# Patient Record
Sex: MALE | Race: WHITE | NOT HISPANIC OR LATINO | Employment: FULL TIME | URBAN - METROPOLITAN AREA
[De-identification: names, ages, dates, MRNs, and addresses within clinical notes are randomized per-mention and may not be internally consistent; named-entity substitution may affect disease eponyms.]

---

## 2018-01-09 ENCOUNTER — RECORDS - HEALTHEAST (OUTPATIENT)
Dept: LAB | Facility: CLINIC | Age: 58
End: 2018-01-09

## 2018-01-09 LAB
CHOLEST SERPL-MCNC: 132 MG/DL
CREAT UR-MCNC: 34.8 MG/DL
FASTING STATUS PATIENT QL REPORTED: ABNORMAL
HDLC SERPL-MCNC: 38 MG/DL
LDLC SERPL CALC-MCNC: 62 MG/DL
MICROALBUMIN UR-MCNC: <0.5 MG/DL (ref 0–1.99)
MICROALBUMIN/CREAT UR: NORMAL MG/G
TRIGL SERPL-MCNC: 159 MG/DL

## 2018-01-11 LAB — BACTERIA SPEC CULT: NORMAL

## 2018-12-21 ENCOUNTER — RECORDS - HEALTHEAST (OUTPATIENT)
Dept: LAB | Facility: CLINIC | Age: 58
End: 2018-12-21

## 2018-12-21 LAB
FERRITIN SERPL-MCNC: 63 NG/ML (ref 27–300)
IRON SATN MFR SERPL: 18 % (ref 20–50)
IRON SERPL-MCNC: 66 UG/DL (ref 42–175)
TIBC SERPL-MCNC: 362 UG/DL (ref 313–563)
TRANSFERRIN SERPL-MCNC: 289 MG/DL (ref 212–360)

## 2019-06-26 ENCOUNTER — RECORDS - HEALTHEAST (OUTPATIENT)
Dept: LAB | Facility: CLINIC | Age: 59
End: 2019-06-26

## 2019-06-26 LAB
CHOLEST SERPL-MCNC: 126 MG/DL
FASTING STATUS PATIENT QL REPORTED: NO
HDLC SERPL-MCNC: 38 MG/DL
LDLC SERPL CALC-MCNC: 62 MG/DL
TRIGL SERPL-MCNC: 131 MG/DL

## 2020-10-07 ENCOUNTER — RECORDS - HEALTHEAST (OUTPATIENT)
Dept: LAB | Facility: CLINIC | Age: 60
End: 2020-10-07

## 2020-10-07 LAB
ALBUMIN SERPL-MCNC: 4.1 G/DL (ref 3.5–5)
ALP SERPL-CCNC: 76 U/L (ref 45–120)
ALT SERPL W P-5'-P-CCNC: 20 U/L (ref 0–45)
ANION GAP SERPL CALCULATED.3IONS-SCNC: 8 MMOL/L (ref 5–18)
AST SERPL W P-5'-P-CCNC: 17 U/L (ref 0–40)
BILIRUB SERPL-MCNC: 0.3 MG/DL (ref 0–1)
BUN SERPL-MCNC: 10 MG/DL (ref 8–22)
CALCIUM SERPL-MCNC: 9.3 MG/DL (ref 8.5–10.5)
CHLORIDE BLD-SCNC: 101 MMOL/L (ref 98–107)
CHOLEST SERPL-MCNC: 120 MG/DL
CO2 SERPL-SCNC: 27 MMOL/L (ref 22–31)
CREAT SERPL-MCNC: 0.94 MG/DL (ref 0.7–1.3)
FASTING STATUS PATIENT QL REPORTED: NO
GFR SERPL CREATININE-BSD FRML MDRD: >60 ML/MIN/1.73M2
GLUCOSE BLD-MCNC: 223 MG/DL (ref 70–125)
HDLC SERPL-MCNC: 39 MG/DL
LDLC SERPL CALC-MCNC: 55 MG/DL
POTASSIUM BLD-SCNC: 4.6 MMOL/L (ref 3.5–5)
PROT SERPL-MCNC: 6.7 G/DL (ref 6–8)
SODIUM SERPL-SCNC: 136 MMOL/L (ref 136–145)
TRIGL SERPL-MCNC: 128 MG/DL

## 2021-01-27 ENCOUNTER — THERAPY VISIT (OUTPATIENT)
Dept: PHYSICAL THERAPY | Facility: CLINIC | Age: 61
End: 2021-01-27
Payer: COMMERCIAL

## 2021-01-27 DIAGNOSIS — M54.12 CERVICAL RADICULITIS: ICD-10-CM

## 2021-01-27 PROCEDURE — 97110 THERAPEUTIC EXERCISES: CPT | Mod: GP | Performed by: PHYSICAL THERAPIST

## 2021-01-27 PROCEDURE — 97530 THERAPEUTIC ACTIVITIES: CPT | Mod: GP | Performed by: PHYSICAL THERAPIST

## 2021-01-27 PROCEDURE — 97161 PT EVAL LOW COMPLEX 20 MIN: CPT | Mod: GP | Performed by: PHYSICAL THERAPIST

## 2021-01-27 NOTE — PROGRESS NOTES
[facility] Initial Evaluation  Subjective:  The history is provided by the patient.                       Objective:  System    Physical Exam    General     ROS    Assessment/Plan:    Patient is a 60 year old male with cervical complaints.    Patient has the following significant findings with corresponding treatment plan.                Diagnosis 1:  Neck pain  Pain -  hot/cold therapy, US, electric stimulation, mechanical traction, manual therapy, splint/taping/bracing/orthotics, self management, education, directional preference exercise and home program  Decreased ROM/flexibility - manual therapy, therapeutic exercise, therapeutic activity and home program  Decreased joint mobility - manual therapy, therapeutic exercise, therapeutic activity and home program  Decreased strength - therapeutic exercise, therapeutic activities and home program  Impaired balance - neuro re-education, therapeutic activities and home program  Impaired muscle performance - neuro re-education and home program  Decreased function - therapeutic activities and home program  Impaired posture - neuro re-education, therapeutic activities and home program    Therapy Evaluation Codes:   1) History comprised of:   Personal factors that impact the plan of care:      None.    Comorbidity factors that impact the plan of care are:      Diabetes and Overweight.     Medications impacting care: None.  2) Examination of Body Systems comprised of:   Body structures and functions that impact the plan of care:      Cervical spine.   Activity limitations that impact the plan of care are:      Lifting, Reading/Computer work and Sleeping.  3) Clinical presentation characteristics are:   Stable/Uncomplicated.  4) Decision-Making    Low complexity using standardized patient assessment instrument and/or measureable assessment of functional outcome.  Cumulative Therapy Evaluation is: Low complexity.    Previous and current functional  limitations:  (See Goal Flow Sheet for this information)    Short term and Long term goals: (See Goal Flow Sheet for this information)     Communication ability:  Patient appears to be able to clearly communicate and understand verbal and written communication and follow directions correctly.  Treatment Explanation - The following has been discussed with the patient:   RX ordered/plan of care  Anticipated outcomes  Possible risks and side effects  This patient would benefit from PT intervention to resume normal activities.   Rehab potential is good.    Frequency:  1 X week, once daily  Duration:  for 12 weeks  Discharge Plan:  Achieve all LTG.  Independent in home treatment program.  Reach maximal therapeutic benefit.    Please refer to the daily flowsheet for treatment today, total treatment time and time spent performing 1:1 timed codes.

## 2021-02-03 ENCOUNTER — THERAPY VISIT (OUTPATIENT)
Dept: PHYSICAL THERAPY | Facility: CLINIC | Age: 61
End: 2021-02-03
Payer: COMMERCIAL

## 2021-02-03 DIAGNOSIS — M54.12 CERVICAL RADICULITIS: ICD-10-CM

## 2021-02-03 PROCEDURE — 97110 THERAPEUTIC EXERCISES: CPT | Mod: GP | Performed by: PHYSICAL THERAPIST

## 2021-02-10 ENCOUNTER — THERAPY VISIT (OUTPATIENT)
Dept: PHYSICAL THERAPY | Facility: CLINIC | Age: 61
End: 2021-02-10
Payer: COMMERCIAL

## 2021-02-10 DIAGNOSIS — M54.12 CERVICAL RADICULITIS: ICD-10-CM

## 2021-02-10 PROCEDURE — 97110 THERAPEUTIC EXERCISES: CPT | Mod: GP | Performed by: PHYSICAL THERAPIST

## 2021-02-18 ENCOUNTER — THERAPY VISIT (OUTPATIENT)
Dept: PHYSICAL THERAPY | Facility: CLINIC | Age: 61
End: 2021-02-18
Payer: COMMERCIAL

## 2021-02-18 DIAGNOSIS — M54.12 CERVICAL RADICULITIS: ICD-10-CM

## 2021-02-18 PROCEDURE — 97110 THERAPEUTIC EXERCISES: CPT | Mod: GP | Performed by: PHYSICAL THERAPIST

## 2021-02-18 PROCEDURE — 97112 NEUROMUSCULAR REEDUCATION: CPT | Mod: GP | Performed by: PHYSICAL THERAPIST

## 2021-03-02 ENCOUNTER — THERAPY VISIT (OUTPATIENT)
Dept: PHYSICAL THERAPY | Facility: CLINIC | Age: 61
End: 2021-03-02
Payer: COMMERCIAL

## 2021-03-02 DIAGNOSIS — M54.12 CERVICAL RADICULITIS: ICD-10-CM

## 2021-03-02 PROCEDURE — 97110 THERAPEUTIC EXERCISES: CPT | Mod: GP | Performed by: PHYSICAL THERAPIST

## 2021-03-02 PROCEDURE — 97112 NEUROMUSCULAR REEDUCATION: CPT | Mod: GP | Performed by: PHYSICAL THERAPIST

## 2021-03-16 ENCOUNTER — THERAPY VISIT (OUTPATIENT)
Dept: PHYSICAL THERAPY | Facility: CLINIC | Age: 61
End: 2021-03-16
Payer: COMMERCIAL

## 2021-03-16 DIAGNOSIS — M54.12 CERVICAL RADICULITIS: ICD-10-CM

## 2021-03-16 PROCEDURE — 97112 NEUROMUSCULAR REEDUCATION: CPT | Mod: GP | Performed by: PHYSICAL THERAPIST

## 2021-03-16 PROCEDURE — 97110 THERAPEUTIC EXERCISES: CPT | Mod: GP | Performed by: PHYSICAL THERAPIST

## 2021-04-02 ENCOUNTER — IMMUNIZATION (OUTPATIENT)
Dept: NURSING | Facility: CLINIC | Age: 61
End: 2021-04-02
Payer: COMMERCIAL

## 2021-04-02 PROCEDURE — 0001A PR COVID VAC PFIZER DIL RECON 30 MCG/0.3 ML IM: CPT

## 2021-04-02 PROCEDURE — 91300 PR COVID VAC PFIZER DIL RECON 30 MCG/0.3 ML IM: CPT

## 2021-04-23 ENCOUNTER — IMMUNIZATION (OUTPATIENT)
Dept: NURSING | Facility: CLINIC | Age: 61
End: 2021-04-23
Attending: FAMILY MEDICINE
Payer: COMMERCIAL

## 2021-04-23 PROCEDURE — 0002A PR COVID VAC PFIZER DIL RECON 30 MCG/0.3 ML IM: CPT

## 2021-04-23 PROCEDURE — 91300 PR COVID VAC PFIZER DIL RECON 30 MCG/0.3 ML IM: CPT

## 2021-05-12 ENCOUNTER — RECORDS - HEALTHEAST (OUTPATIENT)
Dept: ADMINISTRATIVE | Facility: OTHER | Age: 61
End: 2021-05-12

## 2021-05-12 ENCOUNTER — HOSPITAL ENCOUNTER (OUTPATIENT)
Dept: ULTRASOUND IMAGING | Facility: HOSPITAL | Age: 61
Discharge: HOME | End: 2021-05-12
Attending: FAMILY MEDICINE
Payer: COMMERCIAL

## 2021-05-12 DIAGNOSIS — M79.89 RIGHT LEG SWELLING: ICD-10-CM

## 2021-05-22 ENCOUNTER — HEALTH MAINTENANCE LETTER (OUTPATIENT)
Age: 61
End: 2021-05-22

## 2021-05-25 ENCOUNTER — RECORDS - HEALTHEAST (OUTPATIENT)
Dept: ADMINISTRATIVE | Facility: CLINIC | Age: 61
End: 2021-05-25

## 2021-08-27 ENCOUNTER — LAB REQUISITION (OUTPATIENT)
Dept: LAB | Facility: CLINIC | Age: 61
End: 2021-08-27

## 2021-08-27 DIAGNOSIS — E78.5 HYPERLIPIDEMIA, UNSPECIFIED: ICD-10-CM

## 2021-08-27 LAB
CHOLEST SERPL-MCNC: 123 MG/DL
HDLC SERPL-MCNC: 39 MG/DL
LDLC SERPL CALC-MCNC: 47 MG/DL
TRIGL SERPL-MCNC: 185 MG/DL

## 2021-08-27 PROCEDURE — 80061 LIPID PANEL: CPT | Performed by: FAMILY MEDICINE

## 2021-09-11 ENCOUNTER — HEALTH MAINTENANCE LETTER (OUTPATIENT)
Age: 61
End: 2021-09-11

## 2021-09-26 ENCOUNTER — HOSPITAL ENCOUNTER (OUTPATIENT)
Facility: CLINIC | Age: 61
Setting detail: OBSERVATION
Discharge: HOME | End: 2021-09-27
Attending: EMERGENCY MEDICINE | Admitting: NURSE PRACTITIONER
Payer: COMMERCIAL

## 2021-09-26 DIAGNOSIS — Z11.52 ENCOUNTER FOR SCREENING LABORATORY TESTING FOR SEVERE ACUTE RESPIRATORY SYNDROME CORONAVIRUS 2 (SARS-COV-2): ICD-10-CM

## 2021-09-26 DIAGNOSIS — T78.2XXA ANAPHYLAXIS, INITIAL ENCOUNTER: ICD-10-CM

## 2021-09-26 LAB
ANION GAP SERPL CALCULATED.3IONS-SCNC: 7 MMOL/L (ref 3–14)
BUN SERPL-MCNC: 14 MG/DL (ref 7–30)
CALCIUM SERPL-MCNC: 9.5 MG/DL (ref 8.5–10.1)
CHLORIDE BLD-SCNC: 107 MMOL/L (ref 94–109)
CO2 SERPL-SCNC: 25 MMOL/L (ref 20–32)
CREAT SERPL-MCNC: 0.81 MG/DL (ref 0.66–1.25)
GFR SERPL CREATININE-BSD FRML MDRD: >90 ML/MIN/1.73M2
GLUCOSE BLD-MCNC: 139 MG/DL (ref 70–99)
HOLD SPECIMEN: NORMAL
POTASSIUM BLD-SCNC: 3.8 MMOL/L (ref 3.4–5.3)
SARS-COV-2 RNA RESP QL NAA+PROBE: NEGATIVE
SODIUM SERPL-SCNC: 139 MMOL/L (ref 133–144)

## 2021-09-26 PROCEDURE — 96376 TX/PRO/DX INJ SAME DRUG ADON: CPT

## 2021-09-26 PROCEDURE — 250N000011 HC RX IP 250 OP 636: Performed by: PHYSICIAN ASSISTANT

## 2021-09-26 PROCEDURE — 96365 THER/PROPH/DIAG IV INF INIT: CPT | Performed by: EMERGENCY MEDICINE

## 2021-09-26 PROCEDURE — 99285 EMERGENCY DEPT VISIT HI MDM: CPT | Mod: 25 | Performed by: EMERGENCY MEDICINE

## 2021-09-26 PROCEDURE — 80048 BASIC METABOLIC PNL TOTAL CA: CPT | Performed by: EMERGENCY MEDICINE

## 2021-09-26 PROCEDURE — G0378 HOSPITAL OBSERVATION PER HR: HCPCS

## 2021-09-26 PROCEDURE — 99220 PR INITIAL OBSERVATION CARE,LEVEL III: CPT | Performed by: EMERGENCY MEDICINE

## 2021-09-26 PROCEDURE — 99284 EMERGENCY DEPT VISIT MOD MDM: CPT | Performed by: EMERGENCY MEDICINE

## 2021-09-26 PROCEDURE — 250N000011 HC RX IP 250 OP 636: Performed by: EMERGENCY MEDICINE

## 2021-09-26 PROCEDURE — 36415 COLL VENOUS BLD VENIPUNCTURE: CPT | Performed by: EMERGENCY MEDICINE

## 2021-09-26 PROCEDURE — 96375 TX/PRO/DX INJ NEW DRUG ADDON: CPT

## 2021-09-26 PROCEDURE — 96376 TX/PRO/DX INJ SAME DRUG ADON: CPT | Performed by: EMERGENCY MEDICINE

## 2021-09-26 PROCEDURE — C9803 HOPD COVID-19 SPEC COLLECT: HCPCS | Performed by: EMERGENCY MEDICINE

## 2021-09-26 PROCEDURE — U0005 INFEC AGEN DETEC AMPLI PROBE: HCPCS | Performed by: EMERGENCY MEDICINE

## 2021-09-26 RX ORDER — METHYLPREDNISOLONE SODIUM SUCCINATE 40 MG/ML
40 INJECTION, POWDER, LYOPHILIZED, FOR SOLUTION INTRAMUSCULAR; INTRAVENOUS EVERY 6 HOURS
Status: DISCONTINUED | OUTPATIENT
Start: 2021-09-26 | End: 2021-09-27 | Stop reason: HOSPADM

## 2021-09-26 RX ORDER — ONDANSETRON 2 MG/ML
4 INJECTION INTRAMUSCULAR; INTRAVENOUS EVERY 6 HOURS PRN
Status: DISCONTINUED | OUTPATIENT
Start: 2021-09-26 | End: 2021-09-27 | Stop reason: HOSPADM

## 2021-09-26 RX ORDER — ONDANSETRON 4 MG/1
4 TABLET, ORALLY DISINTEGRATING ORAL EVERY 6 HOURS PRN
Status: DISCONTINUED | OUTPATIENT
Start: 2021-09-26 | End: 2021-09-27 | Stop reason: HOSPADM

## 2021-09-26 RX ORDER — MORPHINE SULFATE 2 MG/ML
2 INJECTION, SOLUTION INTRAMUSCULAR; INTRAVENOUS
Status: DISCONTINUED | OUTPATIENT
Start: 2021-09-26 | End: 2021-09-26

## 2021-09-26 RX ORDER — DIPHENHYDRAMINE HYDROCHLORIDE 50 MG/ML
25 INJECTION INTRAMUSCULAR; INTRAVENOUS EVERY 6 HOURS
Status: DISCONTINUED | OUTPATIENT
Start: 2021-09-26 | End: 2021-09-27 | Stop reason: HOSPADM

## 2021-09-26 RX ORDER — DIPHENHYDRAMINE HYDROCHLORIDE 50 MG/ML
25 INJECTION INTRAMUSCULAR; INTRAVENOUS ONCE
Status: COMPLETED | OUTPATIENT
Start: 2021-09-26 | End: 2021-09-26

## 2021-09-26 RX ADMIN — DIPHENHYDRAMINE HYDROCHLORIDE 25 MG: 50 INJECTION INTRAMUSCULAR; INTRAVENOUS at 17:02

## 2021-09-26 RX ADMIN — FAMOTIDINE 20 MG: 20 INJECTION, SOLUTION INTRAVENOUS at 17:02

## 2021-09-26 RX ADMIN — METHYLPREDNISOLONE SODIUM SUCCINATE 40 MG: 40 INJECTION, POWDER, FOR SOLUTION INTRAMUSCULAR; INTRAVENOUS at 23:31

## 2021-09-26 RX ADMIN — DIPHENHYDRAMINE HYDROCHLORIDE 25 MG: 50 INJECTION, SOLUTION INTRAMUSCULAR; INTRAVENOUS at 23:31

## 2021-09-26 ASSESSMENT — ENCOUNTER SYMPTOMS
SHORTNESS OF BREATH: 1
ALLERGIC/IMMUNOLOGIC COMMENTS: POSITIVE FOR HIVES
TROUBLE SWALLOWING: 1

## 2021-09-26 NOTE — ED TRIAGE NOTES
Pt BIBA from urgent care due to allergic reaction. Pt states that he does not know what caused this reaction however had an acute onset of difficulty swallowing and breathing, left eye swelling and angioedema. IM epi administered at urgent care as well as 25mg PO benadryl and 25mg IV benadryl. Pt reports relief at this time, however states it is still somewhat difficult to swallow.

## 2021-09-26 NOTE — LETTER
Date: Sep 27, 2021    TO WHOM IT MAY CONCERN:    Patient Santhosh Barreto was in the hospital from 9/26/2021 to 9/27/2021.  Please excuse him from work. He may return to work 9/28/2021 without restrictions.     Elly Granados PA-C

## 2021-09-26 NOTE — ED NOTES
[facility]   ED Nurse to Floor Handoff     Santhosh Barreto is a 61 year old male who speaks English and lives with a spouse,  in a home  They arrived in the ED by ambulance from clinic    ED Chief Complaint: Allergic Reaction    ED Dx;   Final diagnoses:   None         Needed?: No    Allergies: No Known Allergies.  Past Medical Hx: No past medical history on file.   Baseline Mental status: WDL  Current Mental Status changes: at basesline    Infection present or suspected this encounter: no  Sepsis suspected: No  Isolation type: No active isolations  Patient tested for COVID 19 prior to admission: YES     Activity level - Baseline/Home:  Independent  Activity Level - Current:   Independent    Bariatric equipment needed?: No    In the ED these meds were given:   Medications   diphenhydrAMINE (BENADRYL) injection 25 mg (25 mg Intravenous Given 9/26/21 1702)   famotidine (PEPCID) infusion 20 mg (0 mg Intravenous Stopped 9/26/21 1721)       Drips running?  No    Home pump  No    Current LDAs  Peripheral IV 09/26/21 Anterior;Right Upper forearm (Active)   Number of days: 0       Peripheral IV 09/26/21 (Active)   Number of days: 0       Labs results:   Labs Ordered and Resulted from Time of ED Arrival Up to the Time of Departure from the ED   EXTRA RED TOP TUBE   EXTRA GREEN TOP (LITHIUM HEPARIN) TUBE   EXTRA PURPLE TOP TUBE   COVID-19 VIRUS (CORONAVIRUS) BY PCR   BASIC METABOLIC PANEL   EXTRA TUBE    Narrative:     The following orders were created for panel order [place] Draw.  Procedure                               Abnormality         Status                     ---------                               -----------         ------                     Extra Red Top Tube[[id number]]                               Final result               Extra Green Top (Lithium...[[id number]]                      Final result               Extra Purple Top Tube[[id number]]                             Final result                 Please view results for these tests on the individual orders.       Imaging Studies: No results found for this or any previous visit (from the past 24 hour(s)).    Recent vital signs:   BP (!) 144/74   Pulse 85   Temp 98.8  F (37.1  C) (Oral)   Resp 20   SpO2 94%     [place]oly Coma Scale Score: 15 (09/26/21 1809)       Cardiac Rhythm: Normal Sinus  Pt needs tele? No  Skin/wound Issues: None    Code Status: Full Code    Pain control: pt had none    Nausea control: pt had none    Abnormal labs/tests/findings requiring intervention: see results    Family present during ED course? Yes   Family Comments/Social Situation comments: bedside currently     Tasks needing completion: None    Prema Kimble RN  [facility] -- [phone] [place] [facility]  [phone] [facility]

## 2021-09-26 NOTE — ED PROVIDER NOTES
[place] EMERGENCY DEPARTMENT ([facility])  9/26/21  History     Chief Complaint   Patient presents with     Allergic Reaction     The history is provided by the patient and medical records.     Santhosh Barreto is a 61 year old male with a past medical history significant for type 2 diabetes mellitus (non-insulin dependent) who presents to the Emergency Department by EMS for evaluation following an allergic reaction. Patient is unable to identify the cause of this reaction. He endorses acute onset of throat swelling accompanied by difficulty swallowing and breathing. He also endorses left eye swelling, right upper lip swelling, and hives along the left side of his face. Since receiving Solu-Medrol, Benadryl, fluids, and epinephrine at urgent care, his throat swelling has reduced by about one third. Other than this, he reports continuation of his symptoms here in the ED. He denies itchiness or rash. He reports that his systolic blood pressure is usually around 138. Here in the ED his systolic blood pressure is 178. He denies history of similar reactions or any known medication reactions. He has been on the same diabetic medications for an extended period of time without any recent dosage changes. He reports that he was sitting at his desk in his office, which is a familiar environment for him, when he began having this reaction. Prior to this he drank water, iced tea, and diet coke. He notes that he did attend his daughter's friend's open house around noon today and ate a variety of foods including ham, cheese, orange juice, celery, sausage sticks, dill dip, stone ground mustard, and a pastry.    Per chart review, patient was seen at [facility] prior to arrival here in the ED. About 30 minutes prior to arrival at Urgent care, patient took 25 mg Benadryl. Patient received IV Solu-Medrol, 25 mg  IV Benadryl, and fluids as well as 0.3 epinephrine at Urgent Care. He was subsequently transferred to  the ED for further evaluation and management.     No past medical history on file.    No past surgical history on file.    No family history on file.    Social History     Tobacco Use     Smoking status: Never Smoker     Smokeless tobacco: Never Used   Substance Use Topics     Alcohol use: Not on file       No current facility-administered medications for this encounter.     Current Outpatient Medications   Medication     acetaminophen-codeine (TYLENOL #3) 300-30 MG per tablet     aspirin 81 MG tablet     benzonatate (TESSALON) 100 MG capsule     METFORMIN HCL PO     Pioglitazone HCl (ACTOS PO)     SIMVASTATIN PO      No Known Allergies      I have reviewed the Medications, Allergies, Past Medical and Surgical History, and Social History in the Epic system.    Review of Systems   HENT: Positive for trouble swallowing.         Positive for throat swelling  Positive for Right upper lip swelling     Eyes:        Positive for left eye swelling   Respiratory: Positive for shortness of breath.    Skin: Negative for rash.   Allergic/Immunologic:        Positive for hives     All other systems reviewed and are negative.    A complete review of systems was performed with pertinent positives and negatives noted in the HPI, and all other systems negative.    Physical Exam   BP: 125/88  Pulse: 78  Temp: 98.8  F (37.1  C)  Resp: 16  SpO2: 100 %      Physical Exam  Vitals and nursing note reviewed.   Constitutional:       General: He is not in acute distress.     Appearance: Normal appearance. He is not diaphoretic.   HENT:      Head: Atraumatic.   Eyes:      General: No scleral icterus.     Pupils: Pupils are equal, round, and reactive to light.   Cardiovascular:      Rate and Rhythm: Normal rate and regular rhythm.      Heart sounds: Normal heart sounds.   Pulmonary:      Effort: No respiratory distress.      Breath sounds: Normal breath sounds.   Abdominal:      General: Bowel sounds are normal.      Palpations: Abdomen is  soft.      Tenderness: There is no abdominal tenderness.   Musculoskeletal:         General: No tenderness.   Skin:     General: Skin is warm.      Findings: Rash present.      Comments: Marked angioedema of the left periorbital region, hives to left cheek   Neurological:      General: No focal deficit present.      Mental Status: He is alert and oriented to person, place, and time.         ED Course     At 4:45 PM the patient was seen and examined by Isabela Michael MD in Room ED09.        Procedures           Results for orders placed or performed during the hospital encounter of 09/26/21 (from the past 24 hour(s))   [place] Draw    Narrative    The following orders were created for panel order [place] Draw.  Procedure                               Abnormality         Status                     ---------                               -----------         ------                     Extra Red Top Tube[[id number]]                               In process                 Extra Green Top (Lithium...[[id number]]                      In process                 Extra Purple Top Tube[[id number]]                            In process                   Please view results for these tests on the individual orders.     Medications   diphenhydrAMINE (BENADRYL) injection 25 mg (25 mg Intravenous Given 9/26/21 1702)   famotidine (PEPCID) infusion 20 mg (20 mg Intravenous New Bag 9/26/21 1702)             Assessments & Plan (with Medical Decision Making)     61 year old male with a past medical history significant for type 2 diabetes mellitus (non-insulin dependent) who presents to the Emergency Department by EMS for evaluation following an allergic reaction.  Prehospital records reviewed to confirm patient received IM injection of epinephrine 0.3 mg along with Benadryl 25 mg p.o. and Solu-Medrol 125 mg IV.  Upon arrival here in the emergency department patient was given further Benadryl 25 mg IV and Pepcid 20 mg IV.  After observation  for 2 hours patient's symptoms are gradually improving.  Patient placed on observation status after case discussion with ED observation provider.  Will transfer out of ED unit after 4 hours of observation.    I have reviewed the nursing notes.    I have reviewed the findings, diagnosis, plan and need for follow up with the patient.    New Prescriptions    No medications on file       Final diagnoses:   Anaphylaxis, initial encounter       IReba am serving as a trained medical scribe to document services personally performed by Isabela Michael MD, based on the provider's statements to me.      I, Isabela Michael MD, was physically present and have reviewed and verified the accuracy of this note documented by Reba Wright.     Isabela Michael MD  9/26/2021   [facility] EMERGENCY DEPARTMENT     Isabela Michael MD  09/26/21 1911

## 2021-09-27 VITALS
OXYGEN SATURATION: 99 % | RESPIRATION RATE: 18 BRPM | SYSTOLIC BLOOD PRESSURE: 145 MMHG | TEMPERATURE: 98.3 F | HEART RATE: 69 BPM | DIASTOLIC BLOOD PRESSURE: 81 MMHG

## 2021-09-27 LAB
ERYTHROCYTE [DISTWIDTH] IN BLOOD BY AUTOMATED COUNT: 13 % (ref 10–15)
HCT VFR BLD AUTO: 40 % (ref 40–53)
HGB BLD-MCNC: 13.9 G/DL (ref 13.3–17.7)
HOLD SPECIMEN: NORMAL
MCH RBC QN AUTO: 30.6 PG (ref 26.5–33)
MCHC RBC AUTO-ENTMCNC: 34.8 G/DL (ref 31.5–36.5)
MCV RBC AUTO: 88 FL (ref 78–100)
PLATELET # BLD AUTO: 88 10E3/UL (ref 150–450)
RBC # BLD AUTO: 4.54 10E6/UL (ref 4.4–5.9)
WBC # BLD AUTO: 6.2 10E3/UL (ref 4–11)

## 2021-09-27 PROCEDURE — 96376 TX/PRO/DX INJ SAME DRUG ADON: CPT

## 2021-09-27 PROCEDURE — 36415 COLL VENOUS BLD VENIPUNCTURE: CPT | Performed by: NURSE PRACTITIONER

## 2021-09-27 PROCEDURE — 250N000011 HC RX IP 250 OP 636: Performed by: PHYSICIAN ASSISTANT

## 2021-09-27 PROCEDURE — 85027 COMPLETE CBC AUTOMATED: CPT | Performed by: NURSE PRACTITIONER

## 2021-09-27 PROCEDURE — G0378 HOSPITAL OBSERVATION PER HR: HCPCS

## 2021-09-27 RX ORDER — FAMOTIDINE 20 MG/1
20 TABLET, FILM COATED ORAL 2 TIMES DAILY
Qty: 10 TABLET | Refills: 0 | Status: SHIPPED | OUTPATIENT
Start: 2021-09-27 | End: 2021-10-02

## 2021-09-27 RX ORDER — DIPHENHYDRAMINE HCL 25 MG
25 TABLET ORAL EVERY 6 HOURS
Qty: 4 TABLET | Refills: 0 | Status: SHIPPED | OUTPATIENT
Start: 2021-09-27 | End: 2021-09-28

## 2021-09-27 RX ORDER — EPINEPHRINE 0.15 MG/.3ML
0.15 INJECTION INTRAMUSCULAR ONCE
Qty: 0.3 ML | Refills: 0 | Status: SHIPPED | OUTPATIENT
Start: 2021-09-27 | End: 2021-09-27

## 2021-09-27 RX ORDER — PREDNISONE 20 MG/1
40 TABLET ORAL DAILY
Qty: 6 TABLET | Refills: 0 | Status: SHIPPED | OUTPATIENT
Start: 2021-09-28 | End: 2021-10-01

## 2021-09-27 RX ORDER — DIPHENHYDRAMINE HCL 25 MG
25 CAPSULE ORAL EVERY 6 HOURS PRN
Qty: 12 CAPSULE | Refills: 0 | Status: SHIPPED | OUTPATIENT
Start: 2021-09-27

## 2021-09-27 RX ADMIN — DIPHENHYDRAMINE HYDROCHLORIDE 25 MG: 50 INJECTION, SOLUTION INTRAMUSCULAR; INTRAVENOUS at 05:24

## 2021-09-27 RX ADMIN — FAMOTIDINE 20 MG: 20 INJECTION, SOLUTION INTRAVENOUS at 00:56

## 2021-09-27 RX ADMIN — FAMOTIDINE 20 MG: 20 INJECTION, SOLUTION INTRAVENOUS at 08:33

## 2021-09-27 RX ADMIN — METHYLPREDNISOLONE SODIUM SUCCINATE 40 MG: 40 INJECTION, POWDER, FOR SOLUTION INTRAMUSCULAR; INTRAVENOUS at 05:24

## 2021-09-27 NOTE — PLAN OF CARE
- Diagnostic tests and consults completed and resulted: No   - Vital signs normal or at patient baseline: No, BP elevated

## 2021-09-27 NOTE — H&P
"ED OBSERVATION HISTORY & PHYSICAL    Admission Date: 09/26/21  Attending Physician: Dr. Harika Grover    NP/PA: Suri Medina PA-C    REASON FOR ADMISSION:   Chief Complaint   Patient presents with     Allergic Reaction         HPI:    Santhosh Barreto is a 61 year old male with a history of IDANIA on CPAP, DM2 who presented to the ED with left eye swelling, left neck hives and perioral swelling.  He had been at an open house earlier today where he ate a variety of foods including raw vegetables and some dips.  About two hours later he developed sensation of throat swelling, difficulty swallowing, difficulty breathing, left eye and upper lip swelling, and neck hives (primarily left sided).  He presented to a local urgent care where he received steroids, fluids, benadryl and epinephrine.  He was subsequently transferred to the ED for further monitoring.     In the ED the patient was vitally stable and received IV pepcid and Benadryl. He had previously been evaluated at a local Urgent Care, where he received epinephrine.  This did not need to be repeated.  He was observed in the ED for four hours prior to admission to Observation.    On admission to the observation unit the patient was stable.  He is speaking clearly and states that the sensation of swelling in the back of his throat is much improved.  His left eye continues to feel swollen and is tearing up frequently but he denies foreign body sensation or gritty quality.  He is able to see clearly.  He states that his lips feel \"back to normal\" at this time.  He denies any former history of allergic reaction, recent medication changes, current or former ACEI use.     ROS:    CONSTITUTIONAL: Generally feels well. Denies fever, chills, sweats, fatigue, weakness, weight loss, or appetite changes.  SKIN: Denies rash, itching, bruising, new lumps or bumps, ecchymosis, hair changes or nail changes.  EYES: Denies visual changes, blurred vision, double vision, or eye " pain  EARS/NOSE/THROAT: Denies hearing loss, tinnitus, sinus pressure/drainage, PND, nasal congestion, runny nose, epistaxis, sore throat/mouth pain, change in taste, ear pain, bleeding gums, or hoarseness.  RESPIRATORY: Denies dyspnea at rest or with activity, cough, or hemoptysis.  CARDIOVASCULAR: Denies palpitations, chest pain/pressure, orthopnea, edema or open areas on extremities.  GASTROINTESTINAL: Good appetite and PO intake. Denies dysphagia, heartburn, nausea, vomiting, abdominal pain, constipation, or diarrhea.  GENITOURINARY: Denies dysuria, frequency, urgency, hesitancy, hematuria, or incontinence  MUSCULOSKELTAL: Denies muscle/joint pain and weakness  NEUROLOGIC: Denies headaches, dizziness, numbness or tingling of hands and feet, confusion, memory changes, lightheadedness/dizziness or difficulties with balance.  PSYCHIATRIC: Denies anxiety, depression, mental status changes, or change in mood.  HEME/LYMPH: Denies active bleeding, swollen nodes  VASCULAR ACCESS: Denies pain, redness, or discharge.    ROS negative other than the symptoms noted above.    History:    Medical History:   DM2 on oral therapy  IDANIA on CPAP    Patient denies surgical history    Patient states he has as on that has allergies to cats.  No family history of food allergies that he is aware of    Social History     Socioeconomic History     Marital status:      Spouse name: Not on file     Number of children: Not on file     Years of education: Not on file     Highest education level: Not on file   Occupational History     Not on file   Tobacco Use     Smoking status: Never Smoker     Smokeless tobacco: Never Used   Substance and Sexual Activity     Alcohol use: Not on file     Drug use: Not on file     Sexual activity: Not on file   Other Topics Concern     Not on file   Social History Narrative     Not on file     Social Determinants of Health     Financial Resource Strain:      Difficulty of Paying Living Expenses:    Food  Insecurity:      Worried About Running Out of Food in the Last Year:      Ran Out of Food in the Last Year:    Transportation Needs:      Lack of Transportation (Medical):      Lack of Transportation (Non-Medical):    Physical Activity:      Days of Exercise per Week:      Minutes of Exercise per Session:    Stress:      Feeling of Stress :    Social Connections:      Frequency of Communication with Friends and Family:      Frequency of Social Gatherings with Friends and Family:      Attends Buddhism Services:      Active Member of Clubs or Organizations:      Attends Club or Organization Meetings:      Marital Status:    Intimate Partner Violence:      Fear of Current or Ex-Partner:      Emotionally Abused:      Physically Abused:      Sexually Abused:        No current facility-administered medications on file prior to encounter.  acetaminophen-codeine (TYLENOL #3) 300-30 MG per tablet, Take 1-2 tablets by mouth nightly as needed for moderate pain or pain  aspirin 81 MG tablet, Take by mouth daily  benzonatate (TESSALON) 100 MG capsule, Take 1-2 capsules by mouth 3 times daily as needed for cough.  METFORMIN HCL PO, Take  by mouth.  Pioglitazone HCl (ACTOS PO),   SIMVASTATIN PO,         Exam:  Vitals:  B/P: 148/81, T: 97.4, P: 73, R: 18    All vital signs were reviewed.  GENERAL APPEARENCE: Pleasant, generally appears well, A/O x4. NAD.    SKIN: Clean, dry, and intact without visible lesions, rash, jaundice, cyanosis, erythema, ecchymoses to exposed areas.  HEENT: left eyelid with some asymmetric swelling, no scleral injection, no periorbital cellulitis  NECK: Supple, no masses. No jugular venous distention. No hives appreciated on exam.    CARDIOVASCULAR: S1, S2 RRR. No murmurs, rubs, or gallops.   RESPIRATORY: Respiratory effort WNL. CTA  bilaterally without crackles/rales/wheeze   GI: Active BS in all 4 quadrants. Abdomen soft and non-tender. No masses or hepatosplenomegaly.  : Deferred  MUSCULOSKELETAL:  Gait is steady. Strength 5/5 in major muscle groups of bilateral UE and LE.  Extremities normal, no gross deformities noted, non-tender to palpation.   PV: 2+ bilateral radial and pedal pulses. No edema noted.   NEURO: CN II-XII grossly intact. Speech normal. Appropriate throughout interview.   Sensation grossly WNL. Finger to nose and rapid alternating movements WDL.  HEME/LYMPH: No visible bleeding.  PSYCHIATRIC: Mentation and affect appear normal  VASCULAR ACCESS: CDI without erythema or discharge. Non-tender.    Data:    Results for orders placed or performed during the hospital encounter of 09/26/21   Extra Red Top Tube     Status: None   Result Value Ref Range    Hold Specimen JIC    Extra Green Top (Lithium Heparin) Tube     Status: None   Result Value Ref Range    Hold Specimen JIC    Extra Purple Top Tube     Status: None   Result Value Ref Range    Hold Specimen JIC    Asymptomatic COVID-19 Virus (Coronavirus) by PCR Nasopharyngeal     Status: Normal    Specimen: Nasopharyngeal; Swab   Result Value Ref Range    SARS CoV2 PCR Negative Negative    Narrative    Testing was performed using the Xpert Xpress SARS-CoV-2 Assay on the  Cepheid Gene-Xpert Instrument Systems. Additional information about  this Emergency Use Authorization (EUA) assay can be found via the Lab  Guide. This test should be ordered for the detection of SARS-CoV-2 in  individuals who meet SARS-CoV-2 clinical and/or epidemiological  criteria. Test performance is unknown in asymptomatic patients. This  test is for in vitro diagnostic use under the FDA EUA for  laboratories certified under CLIA to perform high complexity testing.  This test has not been FDA cleared or approved. A negative result  does not rule out the presence of PCR inhibitors in the specimen or  target RNA in concentration below the limit of detection for the  assay. The possibility of a false negative should be considered if  the patient's recent exposure or clinical  presentation suggests  COVID-19. This test was validated by the [facility]. This laboratory is certified under  the Clinical Laboratory Improvement Amendments of 1988 (CLIA-88) as  qualified to perform high complexity laboratory testing.     Basic metabolic panel     Status: Abnormal   Result Value Ref Range    Sodium 139 133 - 144 mmol/L    Potassium 3.8 3.4 - 5.3 mmol/L    Chloride 107 94 - 109 mmol/L    Carbon Dioxide (CO2) 25 20 - 32 mmol/L    Anion Gap 7 3 - 14 mmol/L    Urea Nitrogen 14 7 - 30 mg/dL    Creatinine 0.81 0.66 - 1.25 mg/dL    Calcium 9.5 8.5 - 10.1 mg/dL    Glucose 139 (H) 70 - 99 mg/dL    GFR Estimate >90 >60 mL/min/1.73m2   [place] Draw     Status: None    Narrative    The following orders were created for panel order [place] Draw.  Procedure                               Abnormality         Status                     ---------                               -----------         ------                     Extra Red Top Tube[[id number]]                               Final result               Extra Green Top (Lithium...[[id number]]                      Final result               Extra Purple Top Tube[[id number]]                            Final result                 Please view results for these tests on the individual orders.        Assessment/Plan:  Santhosh Barreto is a 61 year old male with a history of IDANIA on CPAP, DM2 who presented to the ED with anaphylactic reaction of unknown source.    1. Anaphylaxis  - Admit to observation on telemetry  - Continue serial H2 blocker, antihistamine, steroids.   - Likely discharge in am with ongoing improvement, consider steroids at discharge.   - Epinephrine prn for difficulty breathing, hypotension  - Consider epipen at discharge    2. IDANIA on CPAP  - RT to provide CPAP with home settings    3. DM2  - hold oral agents at this time.      4. Hyperlipidemia  - Hold statin    FEN:  -Regular diet as  tolerated.    Prophy:  -No VTE prophy as patient is up ad jil and anticipate short observation stay   -Encourage ambulation as tolerated     CODE STATUS:  FULL CODE     DISPOSITION: [place] to observation, anticipate stay to be < 2 midnights.       Suri Medina PA-C  Emergency Department Observation Unit

## 2021-09-27 NOTE — PLAN OF CARE
- Diagnostic tests and consults completed and resulted: No   - Vital signs normal or at patient baseline: Yes     Blood pressure 132/54, pulse 84, temperature 98.4  F (36.9  C), temperature source Oral, resp. rate 18, SpO2 97 %.

## 2021-09-27 NOTE — PROGRESS NOTES
Emergency Medicine Observation Attending note    The patient was independently seen and examined by me. The chart, vital signs, and labs were reviewed. The patient's findings were discussed with the DANNY on the observation unit, and I agree with the findings of the note and the plan.    61 year old male with a past medical history significant for type 2 diabetes mellitus (non-insulin dependent), admitted to ED OBS after presenting to the ER with concern for allergic reaction. He reported acute onset of throat, left eye and right upper swelling accompanied by difficulty swallowing and breathing. He also had hives along the left side of his face at that time. He reportedly received solumedrol, benadryl, fluids and epinephrine at urgent care - and his throat symptoms seemed to improve some, but his other symptoms continued. No previous similar reactions, and he didn't know what he was reacting to. No known new exposures/medications. While in the ED he was given additional IV benadryl as well as IV pepcid, with mild improvement. He was admitted to ED OBS for further monitoring. This morning he states that all of his sx are gone, with the exception of some watering of the left eye.    BP (!) 145/81 (BP Location: Left arm)   Pulse 69   Temp 98.3  F (36.8  C) (Oral)   Resp 18   SpO2 99%     Exam:  General: awake, alert, NAD  HEENT: NC/AT, oropharynx moist and clear  Neck: supple  Lungs: CTA-B  Heart: RRR, no M/R/G  Abd: soft, ND/NT  Ext: non-tender    Assessment/plan:  1. Anaphylaxis - improving. Unclear trigger. Will discharge with total of 5 day burst of steroids (3 additional days after today), epipen (indications for use discussed), benadryl, famotidine.   2. Thrombocytopenia - no old for comparison. Unclear cause. F/u with pmd discussed with pt for further evaluation.

## 2021-09-27 NOTE — PLAN OF CARE
Observation goals:    - Diagnostic tests and consults completed and resulted: No.   - Vital signs normal or at patient baseline: Yes.

## 2021-09-27 NOTE — DISCHARGE SUMMARY
Discharge Summary    Santhosh Barreto MRN# [id number]   YOB: 1960 Age: 61 year old     Date of Admission:  9/26/2021  Date of Discharge:  9/27/2021 11:30 AM  Admitting Physician:  LYUDMILA Cardoso CNP  Discharge Physician:  Harika Grover MD    Discharging Service:  Emergency Department Observation Unit     Primary Provider: Mira Martini          Discharge Diagnosis:     Anaphylaxis    * No resolved hospital problems. *               Discharge Disposition:   Discharged to home           Condition on Discharge:   Discharge condition: Stable   Code status on discharge: Full Code           Procedures:   No procedures performed during this admission          Discharge Medications:     Current Discharge Medication List      START taking these medications    Details   diphenhydrAMINE (BENADRYL) 25 MG capsule Take 1 capsule (25 mg) by mouth every 6 hours as needed for itching or allergies  Qty: 12 capsule, Refills: 0    Associated Diagnoses: Anaphylaxis, initial encounter      diphenhydrAMINE (BENADRYL) 25 MG tablet Take 1 tablet (25 mg) by mouth every 6 hours for 1 day  Qty: 4 tablet, Refills: 0    Associated Diagnoses: Anaphylaxis, initial encounter      EPINEPHrine (EPIPEN JR) 0.15 MG/0.3ML injection 2-pack Inject 0.3 mLs (0.15 mg) into the muscle once for 1 dose  Qty: 0.3 mL, Refills: 0    Associated Diagnoses: Anaphylaxis, initial encounter      famotidine (PEPCID) 20 MG tablet Take 1 tablet (20 mg) by mouth 2 times daily for 5 days  Qty: 10 tablet, Refills: 0    Associated Diagnoses: Anaphylaxis, initial encounter      predniSONE (DELTASONE) 20 MG tablet Take 2 tablets (40 mg) by mouth daily for 3 days  Qty: 6 tablet, Refills: 0    Associated Diagnoses: Anaphylaxis, initial encounter         CONTINUE these medications which have NOT CHANGED    Details   acetaminophen-codeine (TYLENOL #3) 300-30 MG per tablet Take 1-2 tablets by mouth nightly as needed for moderate pain or pain  Qty:  20 tablet, Refills: 0    Associated Diagnoses: Throat pain; Otalgia, right ear      aspirin 81 MG tablet Take by mouth daily      benzonatate (TESSALON) 100 MG capsule Take 1-2 capsules by mouth 3 times daily as needed for cough.  Qty: 30 capsule, Refills: 0    Associated Diagnoses: Cough      METFORMIN HCL PO Take  by mouth.      Pioglitazone HCl (ACTOS PO)       SIMVASTATIN PO                    Consultations:   No consultations were requested during this admission             Brief History of Illness:   Santhosh Barreto is a 61 year old male with a history of IDANIA on CPAP, DM2 who presented to the ED with anaphylactic reaction of unknown source.                Hospital Course:   Santhosh Barreto is a 61 year old male with a history of IDANIA on CPAP, DM2 who presented to the ED with left eye swelling, left neck hives and perioral swelling.  He had been at an open house earlier today where he ate a variety of foods including raw vegetables and some dips.  About two hours later he developed sensation of throat swelling, difficulty swallowing, difficulty breathing, left eye and upper lip swelling, and neck hives (primarily left sided).  He presented to a local urgent care where he received steroids, fluids, benadryl and epinephrine.  He was subsequently transferred to the ED for further monitoring.      In the ED the patient was vitally stable and received IV pepcid and Benadryl. He had previously been evaluated at a local Urgent Care, where he received epinephrine.  This did not need to be repeated.  He was observed in the ED for four hours prior to admission to Observation.    On admission to the observation unit the patient was stable.  He is speaking clearly and states that the sensation of swelling in the back of his throat is much improved.  His left eye continues to feel swollen and is tearing up frequently but he denies foreign body sensation or gritty quality.  He is able to see clearly.  He states that his  "lips feel \"back to normal\" at this time.  He denies any former history of allergic reaction, recent medication changes, current or former ACEI use.         1. Anaphylaxis  - Patient feeling medically improved this morning. Denies difficulty with swallowing or difficulty with breathing. He denies sore throat, pruritis, chest pain or SOB. Patient HD stable.     - Continue serial H2 blocker, antihistamine, steroids.   - Epinephrine prn for difficulty breathing, hypotension  - Follow up with PCP in 1 week       2. IDANIA on CPAP  - RT to provide CPAP with home settings     3. DM2  - Resume oral agents at this time.       4. Hyperlipidemia  - Resume statin            Final Day of Progress before Discharge:       Physical Exam:  Blood pressure (!) 145/81, pulse 69, temperature 98.3  F (36.8  C), temperature source Oral, resp. rate 18, SpO2 99 %.    EXAM:  Physical Exam   Constitutional: Pt is oriented to person, place, and time.Pt appears well-developed and well-nourished.   HENT:   Head: Normocephalic and atraumatic.   Eyes: Conjunctivae are normal. Pupils are equal, round, and reactive to light.   Neck: Normal range of motion. Neck supple.   Cardiovascular: Normal rate, regular rhythm, normal heart sounds and intact distal pulses.    Pulmonary/Chest: Effort normal and breath sounds normal. No respiratory distress. Pt has no wheezes. Pt has no rales  Abdominal: Soft. Bowel sounds are normal. Pt exhibits no distension and no mass. No tenderness. Pt has no rebound and no guarding.   Musculoskeletal: Normal range of motion. Pt exhibits no edema.   Neurological: Pt is alert and oriented to person, place, and time. Normal reflexes.   Skin: Skin is warm and dry. No rash noted.   Psychiatric: Pt has a normal mood and affect. Behavior is normal. Judgment and thought content normal.             Data:  All laboratory data reviewed             Significant Results:   None  Results for orders placed or performed during the hospital " encounter of 09/26/21   Extra Red Top Tube     Status: None   Result Value Ref Range    Hold Specimen JIC    Extra Green Top (Lithium Heparin) Tube     Status: None   Result Value Ref Range    Hold Specimen JIC    Extra Purple Top Tube     Status: None   Result Value Ref Range    Hold Specimen JIC    Asymptomatic COVID-19 Virus (Coronavirus) by PCR Nasopharyngeal     Status: Normal    Specimen: Nasopharyngeal; Swab   Result Value Ref Range    SARS CoV2 PCR Negative Negative    Narrative    Testing was performed using the Xpert Xpress SARS-CoV-2 Assay on the  Cepheid Gene-Xpert Instrument Systems. Additional information about  this Emergency Use Authorization (EUA) assay can be found via the Lab  Guide. This test should be ordered for the detection of SARS-CoV-2 in  individuals who meet SARS-CoV-2 clinical and/or epidemiological  criteria. Test performance is unknown in asymptomatic patients. This  test is for in vitro diagnostic use under the FDA EUA for  laboratories certified under CLIA to perform high complexity testing.  This test has not been FDA cleared or approved. A negative result  does not rule out the presence of PCR inhibitors in the specimen or  target RNA in concentration below the limit of detection for the  assay. The possibility of a false negative should be considered if  the patient's recent exposure or clinical presentation suggests  COVID-19. This test was validated by the [facility]. This laboratory is certified under  the Clinical Laboratory Improvement Amendments of 1988 (CLIA-88) as  qualified to perform high complexity laboratory testing.     Basic metabolic panel     Status: Abnormal   Result Value Ref Range    Sodium 139 133 - 144 mmol/L    Potassium 3.8 3.4 - 5.3 mmol/L    Chloride 107 94 - 109 mmol/L    Carbon Dioxide (CO2) 25 20 - 32 mmol/L    Anion Gap 7 3 - 14 mmol/L    Urea Nitrogen 14 7 - 30 mg/dL    Creatinine 0.81 0.66 - 1.25  mg/dL    Calcium 9.5 8.5 - 10.1 mg/dL    Glucose 139 (H) 70 - 99 mg/dL    GFR Estimate >90 >60 mL/min/1.73m2   CBC with platelets     Status: Abnormal   Result Value Ref Range    WBC Count 6.2 4.0 - 11.0 10e3/uL    RBC Count 4.54 4.40 - 5.90 10e6/uL    Hemoglobin 13.9 13.3 - 17.7 g/dL    Hematocrit 40.0 40.0 - 53.0 %    MCV 88 78 - 100 fL    MCH 30.6 26.5 - 33.0 pg    MCHC 34.8 31.5 - 36.5 g/dL    RDW 13.0 10.0 - 15.0 %    Platelet Count 88 (L) 150 - 450 10e3/uL   Extra Green Top (Lithium Heparin) Tube     Status: None   Result Value Ref Range    Hold Specimen JIC    [facility] Draw     Status: None    Narrative    The following orders were created for panel order [facility] Draw.  Procedure                               Abnormality         Status                     ---------                               -----------         ------                     Extra Red Top Tube[[id number]]                               Final result               Extra Green Top (Lithium...[[id number]]                      Final result               Extra Purple Top Tube[[id number]]                            Final result                 Please view results for these tests on the individual orders.   Extra Tube     Status: None    Narrative    The following orders were created for panel order Extra Tube.  Procedure                               Abnormality         Status                     ---------                               -----------         ------                     Extra Green Top (Lithium...[[id number]]                      Final result                 Please view results for these tests on the individual orders.      No results found for this or any previous visit (from the past 48 hour(s)).             Pending Results:   Unresulted Labs Ordered in the Past 30 Days of this Admission     No orders found for last 31 day(s).                  Discharge Instructions and Follow-Up:     Discharge Procedure Orders   Reason for your hospital stay    Order Comments: Allergic reaction     Activity   Order Comments: Your activity upon discharge: activity as tolerated     Order Specific Question Answer Comments   Is discharge order? Yes      Follow Up and recommended labs and tests   Order Comments: Follow up with your primary to recheck platelet level in 1-2 weeks     When to contact your care team   Order Comments: Return to the ED with fever, uncontrolled nausea, vomiting, unrelieved pain,  dizziness, chest pain, shortness of breath, loss of consciousness, and any new or concerning symptoms.     Discharge Instructions   Order Comments: You were admitted for anaphylaxis. You have been treated with steroids, benadryl, zyrtac, and zantac. Your symptoms are improving. You are being discharged with Epinephrine which is medicine used to treat severe allergic reactions such as anaphylaxis. It is given as a shot into the outer thigh muscle. Keep 2 shots of epinephrine with you at all times. You may need a second shot, because epinephrine only works for about 20 minutes and symptoms may return. Check the expiration date every month and replace it before it expires. Medicines such as antihistamines, steroids, and bronchodilators decrease inflammation, open airways, and make breathing easier. Take your medication as directed. Follow up with an allergist for further evaluation. Follow up with your primary provider for platelet recheck in 1-2 weeks. Return if having worsening symptoms.     Full Code     Order Specific Question Answer Comments   Code status determined by: Discussion with patient/ legal decision maker      Diet   Order Comments: Follow this diet upon discharge: Regular     Order Specific Question Answer Comments   Is discharge order? Yes           Attestation:  Elly Granados PA-C.

## 2021-10-06 ENCOUNTER — LAB REQUISITION (OUTPATIENT)
Dept: LAB | Facility: CLINIC | Age: 61
End: 2021-10-06

## 2021-10-06 DIAGNOSIS — T78.2XXA ANAPHYLACTIC SHOCK, UNSPECIFIED, INITIAL ENCOUNTER: ICD-10-CM

## 2021-10-06 PROCEDURE — 86003 ALLG SPEC IGE CRUDE XTRC EA: CPT | Performed by: PHYSICIAN ASSISTANT

## 2021-10-06 PROCEDURE — 82785 ASSAY OF IGE: CPT | Performed by: PHYSICIAN ASSISTANT

## 2021-10-08 LAB
ALMOND IGE QN: <0.1 KU(A)/L
ALMOND IGE QN: <0.1 KU(A)/L
BRAZIL NUT IGE QN: <0.1 KU(A)/L
CASHEW NUT IGE QN: <0.1 KU(A)/L
CASHEW NUT IGE QN: <0.1 KU(A)/L
CHESTNUT IGE QN: 0.17 KU(A)/L
CLAM IGE QN: <0.1 KU(A)/L
CODFISH IGE QN: <0.1 KU(A)/L
COW MILK IGE QN: <0.1 KU(A)/L
EGG WHITE IGE QN: 0.27 KU(A)/L
HAZELNUT IGE QN: 0.12 KU(A)/L
HAZELNUT IGE QN: 0.15 KU(A)/L
IGE SERPL-ACNC: 123 KU/L (ref 0–114)
LOBSTER IGE QN: <0.1 KU(A)/L
PEANUT IGE QN: 0.16 KU(A)/L
PEANUT IGE QN: 0.16 KU(A)/L
PECAN/HICK NUT IGE QN: <0.1 KU(A)/L
PISTACHIO IGE QN: 0.47 KU(A)/L
SALMON IGE QN: <0.1 KU(A)/L
SALMON IGE QN: <0.1 KU(A)/L
SCALLOP IGE QN: <0.1 KU(A)/L
SCALLOP IGE QN: <0.1 KU(A)/L
SESAME SEED IGE QN: 0.25 KU(A)/L
SHRIMP IGE QN: <0.1 KU(A)/L
SHRIMP IGE QN: <0.1 KU(A)/L
SOYBEAN IGE QN: 0.11 KU(A)/L
TUNA IGE QN: <0.1 KU(A)/L
TUNA IGE QN: <0.1 KU(A)/L
WALNUT IGE QN: 0.11 KU(A)/L
WALNUT IGE QN: 0.12 KU(A)/L
WHEAT IGE QN: 0.17 KU(A)/L

## 2021-10-11 LAB
A ALTERNATA IGE QN: <0.1 KU(A)/L
A FUMIGATUS IGE QN: <0.1 KU(A)/L
BERMUDA GRASS IGE QN: 0.21 KU(A)/L
C HERBARUM IGE QN: <0.1 KU(A)/L
CAT DANDER IGG QN: <0.1 KU(A)/L
CEDAR IGE QN: ABNORMAL
COMMON RAGWEED IGE QN: ABNORMAL
COTTONWOOD IGE QN: 0.14 KU(A)/L
D FARINAE IGE QN: <0.1 KU(A)/L
D PTERONYSS IGE QN: <0.1 KU(A)/L
DOG DANDER+EPITH IGE QN: 0.19 KU(A)/L
IGE SERPL-ACNC: 123 KU/L (ref 0–114)
MAPLE IGE QN: 0.16 KU(A)/L
MARSH ELDER IGE QN: ABNORMAL
MOUSE URINE PROT IGE QN: <0.1 KU(A)/L
NETTLE IGE QN: ABNORMAL
P NOTATUM IGE QN: <0.1 KU(A)/L
ROACH IGE QN: 0.1 KU(A)/L
SALTWORT IGE QN: ABNORMAL
SILVER BIRCH IGE QN: 0.15 KU(A)/L
TIMOTHY IGE QN: 0.15 KU(A)/L
WHITE ASH IGE QN: 0.17 KU(A)/L
WHITE ELM IGE QN: ABNORMAL
WHITE MULBERRY IGE QN: ABNORMAL
WHITE OAK IGE QN: ABNORMAL

## 2021-12-06 ENCOUNTER — IMMUNIZATION (OUTPATIENT)
Dept: NURSING | Facility: CLINIC | Age: 61
End: 2021-12-06
Payer: COMMERCIAL

## 2021-12-06 PROCEDURE — 91300 PR COVID VAC PFIZER DIL RECON 30 MCG/0.3 ML IM: CPT

## 2021-12-06 PROCEDURE — 0004A PR COVID VAC PFIZER DIL RECON 30 MCG/0.3 ML IM: CPT

## 2022-01-14 ENCOUNTER — THERAPY VISIT (OUTPATIENT)
Dept: PHYSICAL THERAPY | Facility: CLINIC | Age: 62
End: 2022-01-14
Payer: COMMERCIAL

## 2022-01-14 DIAGNOSIS — M25.551 HIP PAIN, RIGHT: Primary | ICD-10-CM

## 2022-01-14 PROCEDURE — 97110 THERAPEUTIC EXERCISES: CPT | Mod: GP

## 2022-01-14 PROCEDURE — 97161 PT EVAL LOW COMPLEX 20 MIN: CPT | Mod: GP

## 2022-01-14 ASSESSMENT — ACTIVITIES OF DAILY LIVING (ADL)
PUTTING_ON_SOCKS_AND_SHOES: SLIGHT DIFFICULTY
WALKING_15_MINUTES_OR_GREATER: NO DIFFICULTY AT ALL
STEPPING_UP_AND_DOWN_CURBS: NO DIFFICULTY AT ALL
SITTING_FOR_15_MINUTES: NO DIFFICULTY AT ALL
GOING_UP_1_FLIGHT_OF_STAIRS: NO DIFFICULTY AT ALL
STANDING_FOR_15_MINUTES: MODERATE DIFFICULTY
LIGHT_TO_MODERATE_WORK: SLIGHT DIFFICULTY
HOS_ADL_SCORE(%): 86.67
HOS_ADL_ITEM_SCORE_TOTAL: 52
GOING_DOWN_1_FLIGHT_OF_STAIRS: NO DIFFICULTY AT ALL
DEEP_SQUATTING: SLIGHT DIFFICULTY
RECREATIONAL_ACTIVITIES: SLIGHT DIFFICULTY
TWISTING/PIVOTING_ON_INVOLVED_LEG: NO DIFFICULTY AT ALL
HOW_WOULD_YOU_RATE_YOUR_CURRENT_LEVEL_OF_FUNCTION_DURING_YOUR_USUAL_ACTIVITIES_OF_DAILY_LIVING_FROM_0_TO_100_WITH_100_BEING_YOUR_LEVEL_OF_FUNCTION_PRIOR_TO_YOUR_HIP_PROBLEM_AND_0_BEING_THE_INABILITY_TO_PERFORM_ANY_OF_YOUR_USUAL_DAILY_ACTIVITIES?: 75
WALKING_DOWN_STEEP_HILLS: SLIGHT DIFFICULTY
WALKING_UP_STEEP_HILLS: NO DIFFICULTY AT ALL
ROLLING_OVER_IN_BED: NO DIFFICULTY AT ALL
HOS_ADL_HIGHEST_POTENTIAL_SCORE: 60
WALKING_APPROXIMATELY_10_MINUTES: NO DIFFICULTY AT ALL
WALKING_INITIALLY: SLIGHT DIFFICULTY
HOS_ADL_COUNT: 15
GETTING_INTO_AND_OUT_OF_AN_AVERAGE_CAR: SLIGHT DIFFICULTY

## 2022-01-14 NOTE — PROGRESS NOTES
"Physical Therapy Initial Examination/Evaluation  January 14, 2022    Santhosh Barreto is a 61 year old male referred to physical therapy by Eleazar Walters MD for treatment of R hip/thigh with Precautions/Restrictions/MD instructions Eval and treat.     Therapist Impression:   Patient complains of R hip pain that radiates to thigh and sometimes below knee. Patient found to have unequal WB standing posture, decreased hip and lumbar ROM, impaired hip strength, + GILA SLR, poor balance and tenderness to palpation of R ITB and piriformis. Patient given HEP with standing hip ABD, butterfly stretch, ITB stretch and ball massage/foam roller to ITB.     Subjective:  DOI/onset: 12/20/2022  Acute Injury or Gradual Onset?: Gradual injury over time  Mechanism of Injury: unknown  Related PMH: Softball to R calf last summer, Baker's cyst R knee   Imaging: None  Chief Complaint/Functional Limitations:  Hip/thigh/calf pain with prolonged standing and see below in therapy evaluation codes   Pain: rest 0 /10, activity (standing) 5/10 Location: R LB, hip, thigh, calf Frequency: Intermittent Described as: aching and nagging Alleviated by: Nothing Progression of Symptoms: Unchanged Time of day when pain is worse: Night and Position related  Sleeping: No issues/uninterrupted   Occupation:   Job duties: prolonged sitting, keyboarding/computer use  Current HEP/exercise regimen: Walk, biking  Patient's goals are see chief complaints \"To make this pain stop and find out what it is\"    Other pertinent PMH/Red Flags: Diabetes, Overweight   Barriers at home/work: None as reported by patient  Pertinent Surgical History: R knee 25 years ago  Medications: Metformin, Actos, Mult vitamin, Diabetic, See chart  General health as reported by patient: good  Return to MD:  PRN    HIP EVALUATION      Static Posture  Standing posture: Decreased R LE WB    Dynamic Movement Screen  Single leg stance observations: Difficulty with balance eyes open GILA, " painful standing on R LE  Double limb squat observations: Anterior knee translation, GILA knee valgus  Gait: No significant findings    Lumbar Screen    Trunk Range of Motion  Movement Loss Major Moderate Minimal Nil Pain   Flexion    X    Extension  X      Sidebending R    X    Sidebending L    X    Side Gliding R    X    Side Gliding L    X        Flexibility Left Right   Quadriceps trivial trivial   Hamstrings mild moderate   Figure 4 moderate moderate     Hip and Knee Strength * = painful   MMT Left Right   Hip Abduction 4-/5 3/5   Hip Extension 5-/5 5-/5   Hip Flexion 4/5 3+/5*   Hip IR 5/5 4+/5*     Special Tests   FADIR RUTH ANN Reshma's   Left Negative Negative Negative   Right Negative Negative Positive     Spring testing: Negative  Neural tension: Positive GILA  SI joint test: Negative      Palpation:  Moderate tenderness to palpation at R ITB near knee, R piriformis    Assessment/Plan:  Patient is a 61 year old male with right side hip and thigh,calf  complaints.    Patient has the following significant findings with corresponding treatment plan.                Diagnosis 1:  R hip/thigh pain  Pain -  manual therapy, splint/taping/bracing/orthotics and self management  Decreased ROM/flexibility - manual therapy and therapeutic exercise  Decreased strength - Therapeutic exercise  Decreased balance - Neuro re-ed    Therapy Evaluation Codes:   1) History comprised of:   Personal factors that impact the plan of care:      None.    Comorbidity factors that impact the plan of care are:      Diabetes and Overweight.     Medications impacting care: Diabetes, see chart.  2) Examination of Body Systems comprised of:   Body structures and functions that impact the plan of care:      Hip.   Activity limitations that impact the plan of care are:      Standing and Walking.  3) Clinical presentation characteristics are:   Stable/Uncomplicated.  4) Decision-Making    Low complexity using standardized patient assessment  instrument and/or measureable assessment of functional outcome.  Cumulative Therapy Evaluation is: Low complexity.    Previous and current functional limitations:  (See Goal Flow Sheet for this information)    Short term and Long term goals: (See Goal Flow Sheet for this information)     Communication ability:  Patient appears to be able to clearly communicate and understand verbal and written communication and follow directions correctly.  Treatment Explanation - The following has been discussed with the patient:   RX ordered/plan of care  Anticipated outcomes  Possible risks and side effects  This patient would benefit from PT intervention to resume normal activities.   Rehab potential is good.    Frequency:  1 X week, once daily  Duration:  for 6 weeks  Discharge Plan:  Achieve all LTG.  Independent in home treatment program.  Reach maximal therapeutic benefit.    Please refer to the daily flowsheet for treatment today, total treatment time and time spent performing 1:1 timed codes.

## 2022-01-27 ENCOUNTER — THERAPY VISIT (OUTPATIENT)
Dept: PHYSICAL THERAPY | Facility: CLINIC | Age: 62
End: 2022-01-27
Payer: COMMERCIAL

## 2022-01-27 DIAGNOSIS — M25.551 HIP PAIN, RIGHT: Primary | ICD-10-CM

## 2022-01-27 PROCEDURE — 97140 MANUAL THERAPY 1/> REGIONS: CPT | Mod: GP | Performed by: PHYSICAL THERAPIST

## 2022-01-27 PROCEDURE — 97112 NEUROMUSCULAR REEDUCATION: CPT | Mod: GP | Performed by: PHYSICAL THERAPIST

## 2022-01-27 PROCEDURE — 97110 THERAPEUTIC EXERCISES: CPT | Mod: GP | Performed by: PHYSICAL THERAPIST

## 2022-02-09 ENCOUNTER — THERAPY VISIT (OUTPATIENT)
Dept: PHYSICAL THERAPY | Facility: CLINIC | Age: 62
End: 2022-02-09
Payer: COMMERCIAL

## 2022-02-09 DIAGNOSIS — M25.551 HIP PAIN, RIGHT: ICD-10-CM

## 2022-02-09 PROCEDURE — 97140 MANUAL THERAPY 1/> REGIONS: CPT | Mod: GP | Performed by: PHYSICAL THERAPIST

## 2022-02-09 PROCEDURE — 97110 THERAPEUTIC EXERCISES: CPT | Mod: GP | Performed by: PHYSICAL THERAPIST

## 2022-02-09 PROCEDURE — 97112 NEUROMUSCULAR REEDUCATION: CPT | Mod: GP | Performed by: PHYSICAL THERAPIST

## 2022-03-02 ENCOUNTER — LAB REQUISITION (OUTPATIENT)
Dept: LAB | Facility: CLINIC | Age: 62
End: 2022-03-02

## 2022-03-02 DIAGNOSIS — E11.9 TYPE 2 DIABETES MELLITUS WITHOUT COMPLICATIONS (H): ICD-10-CM

## 2022-03-02 PROCEDURE — 82043 UR ALBUMIN QUANTITATIVE: CPT | Performed by: PHYSICIAN ASSISTANT

## 2022-03-02 PROCEDURE — 82310 ASSAY OF CALCIUM: CPT | Performed by: PHYSICIAN ASSISTANT

## 2022-03-03 LAB
ANION GAP SERPL CALCULATED.3IONS-SCNC: 11 MMOL/L (ref 5–18)
BUN SERPL-MCNC: 14 MG/DL (ref 8–22)
CALCIUM SERPL-MCNC: 9.4 MG/DL (ref 8.5–10.5)
CHLORIDE BLD-SCNC: 99 MMOL/L (ref 98–107)
CO2 SERPL-SCNC: 27 MMOL/L (ref 22–31)
CREAT SERPL-MCNC: 1 MG/DL (ref 0.7–1.3)
CREAT UR-MCNC: 45 MG/DL
GFR SERPL CREATININE-BSD FRML MDRD: 86 ML/MIN/1.73M2
GLUCOSE BLD-MCNC: 167 MG/DL (ref 70–125)
MICROALBUMIN UR-MCNC: <0.5 MG/DL (ref 0–1.99)
MICROALBUMIN/CREAT UR: NORMAL MG/G{CREAT}
POTASSIUM BLD-SCNC: 4 MMOL/L (ref 3.5–5)
SODIUM SERPL-SCNC: 137 MMOL/L (ref 136–145)

## 2022-03-30 PROBLEM — M25.551 HIP PAIN, RIGHT: Status: RESOLVED | Noted: 2022-01-14 | Resolved: 2022-03-30

## 2022-03-30 NOTE — PROGRESS NOTES
Discharge Note    Progress reporting period is from initial evaluation date (please see noted date below) to Feb 9, 2022.  Linked Episodes   Type: Episode: Status: Noted: Resolved: Last update: Updated by:   PHYSICAL THERAPY R hip 1/14/22 Active 1/14/2022 2/9/2022  7:55 AM Lorin Richard, PT      Comments:       Santhosh failed to follow up and current status is unknown.  Please see information below for last relevant information on current status.  Patient seen for 3 visits.    SUBJECTIVE  Subjective changes noted by patient:  Patient states that he is not having any shooting pain, and his R leg only feels week toward the end of his walk. He states the most bothersome area is his lateral calf that is causing a deep ache and wakes him up at night.   .  Current pain level is 1/10.     Previous pain level was  5/10 (standing).   Changes in function:  Yes (See Goal flowsheet attached for changes in current functional level)  Adverse reaction to treatment or activity: None    OBJECTIVE  Changes noted in objective findings: Hypertonicity peroneals R with tenderness. R hip TrPs along glute med and glute max      ASSESSMENT/PLAN  Diagnosis: R hip/thigh pain   Updated problem list and treatment plan:   Pain - HEP  Decreased ROM/flexibility - HEP  Decreased function - HEP  Decreased strength - HEP  STG/LTGs have been met or progress has been made towards goals:  Yes, please see goal flowsheet for most current information  Assessment of Progress: current status is unknown.    Last current status: Pt is progressing as expected   Self Management Plans:  HEP  I have re-evaluated this patient and find that the nature, scope, duration and intensity of the therapy is appropriate for the medical condition of the patient.  Santhosh continues to require the following intervention to meet STG and LTG's:  HEP.    Recommendations:  Discharge with current home program.  Patient to follow up with MD as needed.    Please refer to the  daily flowsheet for treatment today, total treatment time and time spent performing 1:1 timed codes.

## 2022-04-13 ENCOUNTER — TRANSCRIBE ORDERS (OUTPATIENT)
Dept: OTHER | Age: 62
End: 2022-04-13

## 2022-04-13 DIAGNOSIS — M25.562 LEFT KNEE PAIN: Primary | ICD-10-CM

## 2022-04-23 ENCOUNTER — HEALTH MAINTENANCE LETTER (OUTPATIENT)
Age: 62
End: 2022-04-23

## 2022-05-04 ENCOUNTER — THERAPY VISIT (OUTPATIENT)
Dept: PHYSICAL THERAPY | Facility: CLINIC | Age: 62
End: 2022-05-04
Payer: COMMERCIAL

## 2022-05-04 DIAGNOSIS — M25.562 LEFT KNEE PAIN: ICD-10-CM

## 2022-05-04 PROCEDURE — 97161 PT EVAL LOW COMPLEX 20 MIN: CPT | Mod: GP | Performed by: PHYSICAL THERAPIST

## 2022-05-04 PROCEDURE — 97110 THERAPEUTIC EXERCISES: CPT | Mod: GP | Performed by: PHYSICAL THERAPIST

## 2022-05-04 PROCEDURE — 97140 MANUAL THERAPY 1/> REGIONS: CPT | Mod: GP | Performed by: PHYSICAL THERAPIST

## 2022-05-04 ASSESSMENT — ACTIVITIES OF DAILY LIVING (ADL)
SIT WITH YOUR KNEE BENT: ACTIVITY IS MINIMALLY DIFFICULT
HOW_WOULD_YOU_RATE_THE_OVERALL_FUNCTION_OF_YOUR_KNEE_DURING_YOUR_USUAL_DAILY_ACTIVITIES?: NEARLY NORMAL
KNEE_ACTIVITY_OF_DAILY_LIVING_SCORE: 72.86
RISE FROM A CHAIR: ACTIVITY IS MINIMALLY DIFFICULT
STAND: ACTIVITY IS MINIMALLY DIFFICULT
GIVING WAY, BUCKLING OR SHIFTING OF KNEE: I DO NOT HAVE THE SYMPTOM
WEAKNESS: I HAVE THE SYMPTOM BUT IT DOES NOT AFFECT MY ACTIVITY
HOW_WOULD_YOU_RATE_THE_CURRENT_FUNCTION_OF_YOUR_KNEE_DURING_YOUR_USUAL_DAILY_ACTIVITIES_ON_A_SCALE_FROM_0_TO_100_WITH_100_BEING_YOUR_LEVEL_OF_KNEE_FUNCTION_PRIOR_TO_YOUR_INJURY_AND_0_BEING_THE_INABILITY_TO_PERFORM_ANY_OF_YOUR_USUAL_DAILY_ACTIVITIES?: 60
GO UP STAIRS: ACTIVITY IS MINIMALLY DIFFICULT
KNEE_ACTIVITY_OF_DAILY_LIVING_SUM: 51
LIMPING: I HAVE THE SYMPTOM BUT IT DOES NOT AFFECT MY ACTIVITY
SQUAT: ACTIVITY IS SOMEWHAT DIFFICULT
AS_A_RESULT_OF_YOUR_KNEE_INJURY,_HOW_WOULD_YOU_RATE_YOUR_CURRENT_LEVEL_OF_DAILY_ACTIVITY?: NEARLY NORMAL
STIFFNESS: THE SYMPTOM AFFECTS MY ACTIVITY SLIGHTLY
KNEEL ON THE FRONT OF YOUR KNEE: ACTIVITY IS FAIRLY DIFFICULT
GO DOWN STAIRS: ACTIVITY IS SOMEWHAT DIFFICULT
PAIN: THE SYMPTOM AFFECTS MY ACTIVITY SLIGHTLY
SWELLING: I HAVE THE SYMPTOM BUT IT DOES NOT AFFECT MY ACTIVITY
RAW_SCORE: 51
WALK: ACTIVITY IS MINIMALLY DIFFICULT

## 2022-05-04 NOTE — LETTER
[facility]  [address] [place] [address]  [phone]    May 4, 2022    Re: Santhosh Barreto   :   1960  MRN:  [id number]   REFERRING PHYSICIAN:   Jeffery [facility]ADR[facility]  Date of Initial Evaluation:  22  Visits:  Rxs Used: 1  Reason for Referral:  Left knee pain    EVALUATION SUMMARY      [facility] Initial Evaluation  2022     Precautions/Restrictions/MD instructions: eval and treat    Therapist Assessment: Santhosh Barreto is a 61 year old male patient presenting to Physical Therapy with left knee pain. Patient demonstrates decreased knee extension with pain during overpressure, decreased knee extension with pain with over pressure, increased tightness at medial retinaculum of L knee, pain with squatting. Signs and symptoms are consistent with L mechanical knee pain. These impairments limit their ability to walk, squatting, sitting, stairs without pain. Skilled PT services are necessary in order to reduce impairments and improve independent function.    Subjective History  Injury/Condition Details:  Presenting Complaint Left knee pain   Onset Timing/Date 3/5/2022, (Doctor's referral 3/23/202)   Mechanism Squatting down to pet puppies      Symptom Behavior Details    Primary Symptoms Constant symptoms; worsen with activity, pain (Location: lateral and medial knee, more medial now, Quality: Aching/Throbbing and Tender)      Denies locking, catching, giving way, or instability. Denies numbness, tingling, changes in sensation. Denies having related symptoms spreading to the L calf.    Worst Pain 6/10 (with stairs)   Symptom Provocators Walking, stairs, standing, squatting, bending knees    Best Pain 1/10    Symptom Relievers Ice, ibuprofen    Time of day dependent? Worse in evening after activity, Worse in morning and Stiffness in morning   Recent  symptom change? symptoms improving   Re: Santhosh Barreto   :   1960    Prior Testing/Intervention for current condition:  Prior Tests  x-ray   Prior Treatment none     Lifestyle & General Medical History:      Usual physical activities  (within past year) Walking    Orthopaedic History  R hip pain, R knee pain   Medication  Diabetes, overweight   Notable medical history Metformin, Actos, multiple vitamins, diabetic   Patient goals Good    Patient Reported Health good   Red Flags: (Bold when present) - reviewed the following and denies  Malaise, unexplained weight loss, night pain, fever           PHYSICAL THERAPY KNEE EXAM     Dynamic Movement Screen:  DL Squat: Anterior knee translation, Knee valgus, Hip internal rotation and Improper use of glutes/hips    1 leg stance:   Right: proprioceptive challenge and excessive contralateral pelvic drop  Left: proprioceptive challenge and excessive contralateral pelvic drop    1 leg squat:   Right: proprioceptive challenge, excessive contralateral pelvic drop and excessive femoral IR/ADD  Left: proprioceptive challenge and pain    Gait: Trendelenburg L +   Step: Anterior knee translation, Knee valgus, Hip internal rotation and Improper use of glutes/hips    (*Indicates patient s pain)  Knee PROM L  R   Hyperextension 0 7   Extension 0 0   Flexion 110 120   (*Indicates patient s pain)    Hip PROM:     L R   IR 30 30   ER 35 35   Reshma's - -   Alejandro - -     Re: Santhosh Barreto   :   1960    Special tests:   L R   Anterior Drawer - -   Posterior Drawer - -   Lachman's - -   Valgus 0 degrees - -   Valgus 30 degrees - -   Varus 0 degrees - -   Varus 30 degrees - -   Tim's - -   Appley's - -   Lateral Compression - -   Patellar Compression - -   (*Indicates patient's pain)    Resisted Tests Strength (MMT)    L R   Knee Ext 4-/5 4+/5   Knee Flex 5/5 5/5   Hip ABD 4/5 4/5   Hip Flex 4+/5 4+/5   Hip EXT 4-/5 4-/5   (*Indicates patient s  pain)    Patellar tracking: increased stiffness L patella, medial retinaculum tightness     Other:  Joint Line Swelling: none  Knee Joint Effusion (Stroke Test Assessment):  Right: 0  Left: 0  ASSESSMENT/PLAN:  The patient is a 61 year old male with chief complaint of L knee pain.    The patient has the following significant findings with corresponding treatment plan.  Diagnosis 1:  Signs and symptoms consistent with L mechanical knee pain    Pain -  manual therapy, self management, education, directional preference exercise and home program  Decreased ROM/flexibility - manual therapy, therapeutic exercise and home program  Decreased joint mobility - manual therapy, therapeutic exercise and home program  Decreased strength - therapeutic exercise, therapeutic activities and home program  Impaired balance - neuro re-education, therapeutic activities and home program  Decreased proprioception - neuro re-education and therapeutic activities  Impaired gait - gait training and assistive devices  Impaired muscle performance - neuro re-education and home program  Decreased function - therapeutic activities and home program  Impaired posture - neuro re-education, therapeutic activities and home program  Instability -  Therapeutic Activity, Therapeutic Exercise, Neuromuscular Re-education, Splinting/Taping/Bracing/Orthotic, home program      Re: Santhosh Barreto   :   1960      Therapy Evaluation Codes:   1) History comprised of:   Personal factors that impact the plan of care:      None.    Comorbidity factors that impact the plan of care are:      Diabetes and Overweight.     Medications impacting care: Diabetic.  2) Examination of Body Systems comprised of:   Body structures and functions that impact the plan of care:      Knee.   Activity limitations that impact the plan of care are:      Squatting/kneeling, Standing and Walking.  3) Clinical presentation characteristics  are:   Stable/Uncomplicated.  4) Decision-Making    Low complexity using standardized patient assessment instrument and/or measureable assessment of functional outcome.  Cumulative Therapy Evaluation is: Low complexity.    Previous and current functional limitations:  (See Goal Flow Sheet for this information)    Short term and Long term goals: (See Goal Flow Sheet for this information)     Communication ability:  Patient appears to be able to clearly communicate and understand verbal and written communication and follow directions correctly.  Treatment Explanation - The following has been discussed with the patient:   RX ordered/plan of care  Anticipated outcomes  Possible risks and side effects  This patient would benefit from PT intervention to resume normal activities.   Rehab potential is good.    Frequency:  1 X week, once daily  Duration:  for 6 visits  Discharge Plan: Achieve all LTGs, be independent in home treatment program, and reach maximal therapeutic benefit.    Thank you for your referral.    INQUIRIES  Therapist: Lorin Richard, PT, DPT   [facility] [address] [place] [address]  Phone: [phone]  Fax: [phone]

## 2022-05-04 NOTE — PROGRESS NOTES
[facility] Initial Evaluation  5/4/2022     Precautions/Restrictions/MD instructions: eval and treat    Therapist Assessment: Santhosh Barreto is a 61 year old male patient presenting to Physical Therapy with left knee pain. Patient demonstrates decreased knee extension with pain during overpressure, decreased knee extension with pain with over pressure, increased tightness at medial retinaculum of L knee, pain with squatting. Signs and symptoms are consistent with L mechanical knee pain. These impairments limit their ability to walk, squatting, sitting, stairs without pain. Skilled PT services are necessary in order to reduce impairments and improve independent function.    Subjective History    Injury/Condition Details:  Presenting Complaint Left knee pain   Onset Timing/Date 3/5/2022, (Doctor's referral 3/23/202)   Mechanism Squatting down to pet puppies      Symptom Behavior Details    Primary Symptoms Constant symptoms; worsen with activity, pain (Location: lateral and medial knee, more medial now, Quality: Aching/Throbbing and Tender)      Denies locking, catching, giving way, or instability. Denies numbness, tingling, changes in sensation. Denies having related symptoms spreading to the L calf.    Worst Pain 6/10 (with stairs)   Symptom Provocators Walking, stairs, standing, squatting, bending knees    Best Pain 1/10    Symptom Relievers Ice, ibuprofen    Time of day dependent? Worse in evening after activity, Worse in morning and Stiffness in morning   Recent symptom change? symptoms improving     Prior Testing/Intervention for current condition:  Prior Tests  x-ray   Prior Treatment none     Lifestyle & General Medical History:      Usual physical activities  (within past year) Walking    Orthopaedic History  R hip pain, R knee pain   Medication  Diabetes, overweight   Notable medical history Metformin, Actos, multiple vitamins, diabetic   Patient  goals Good    Patient Reported Health good   Red Flags: (Bold when present) - reviewed the following and denies  Malaise, unexplained weight loss, night pain, fever           PHYSICAL THERAPY KNEE EXAM     Dynamic Movement Screen:  DL Squat: Anterior knee translation, Knee valgus, Hip internal rotation and Improper use of glutes/hips    1 leg stance:   Right: proprioceptive challenge and excessive contralateral pelvic drop  Left: proprioceptive challenge and excessive contralateral pelvic drop    1 leg squat:   Right: proprioceptive challenge, excessive contralateral pelvic drop and excessive femoral IR/ADD  Left: proprioceptive challenge and pain    Gait: Trendelenburg L +   Step: Anterior knee translation, Knee valgus, Hip internal rotation and Improper use of glutes/hips    (*Indicates patient s pain)  Knee PROM L  R   Hyperextension 0 7   Extension 0 0   Flexion 110 120   (*Indicates patient s pain)    Hip PROM:     L R   IR 30 30   ER 35 35   Reshma's - -   Alejandro - -       Special tests:   L R   Anterior Drawer - -   Posterior Drawer - -   Lachman's - -   Valgus 0 degrees - -   Valgus 30 degrees - -   Varus 0 degrees - -   Varus 30 degrees - -   Tim's - -   Appley's - -   Lateral Compression - -   Patellar Compression - -   (*Indicates patient's pain)    Resisted Tests Strength (MMT)    L R   Knee Ext 4-/5 4+/5   Knee Flex 5/5 5/5   Hip ABD 4/5 4/5   Hip Flex 4+/5 4+/5   Hip EXT 4-/5 4-/5   (*Indicates patient s pain)    Patellar tracking: increased stiffness L patella, medial retinaculum tightness     Other:  Joint Line Swelling: none  Knee Joint Effusion (Stroke Test Assessment):  Right: 0  Left: 0  ASSESSMENT/PLAN:  The patient is a 61 year old male with chief complaint of L knee pain.    The patient has the following significant findings with corresponding treatment plan.  Diagnosis 1:  Signs and symptoms consistent with L mechanical knee pain    Pain -  manual therapy, self management, education,  directional preference exercise and home program  Decreased ROM/flexibility - manual therapy, therapeutic exercise and home program  Decreased joint mobility - manual therapy, therapeutic exercise and home program  Decreased strength - therapeutic exercise, therapeutic activities and home program  Impaired balance - neuro re-education, therapeutic activities and home program  Decreased proprioception - neuro re-education and therapeutic activities  Impaired gait - gait training and assistive devices  Impaired muscle performance - neuro re-education and home program  Decreased function - therapeutic activities and home program  Impaired posture - neuro re-education, therapeutic activities and home program  Instability -  Therapeutic Activity, Therapeutic Exercise, Neuromuscular Re-education, Splinting/Taping/Bracing/Orthotic, home program      Therapy Evaluation Codes:   1) History comprised of:   Personal factors that impact the plan of care:      None.    Comorbidity factors that impact the plan of care are:      Diabetes and Overweight.     Medications impacting care: Diabetic.  2) Examination of Body Systems comprised of:   Body structures and functions that impact the plan of care:      Knee.   Activity limitations that impact the plan of care are:      Squatting/kneeling, Standing and Walking.  3) Clinical presentation characteristics are:   Stable/Uncomplicated.  4) Decision-Making    Low complexity using standardized patient assessment instrument and/or measureable assessment of functional outcome.  Cumulative Therapy Evaluation is: Low complexity.    Previous and current functional limitations:  (See Goal Flow Sheet for this information)    Short term and Long term goals: (See Goal Flow Sheet for this information)     Communication ability:  Patient appears to be able to clearly communicate and understand verbal and written communication and follow directions correctly.  Treatment Explanation - The following  has been discussed with the patient:   RX ordered/plan of care  Anticipated outcomes  Possible risks and side effects  This patient would benefit from PT intervention to resume normal activities.   Rehab potential is good.    Frequency:  1 X week, once daily  Duration:  for 6 visits  Discharge Plan: Achieve all LTGs, be independent in home treatment program, and reach maximal therapeutic benefit.    Please refer to the daily flowsheet for treatment today, total treatment time and time spent performing 1:1 timed codes.

## 2022-05-18 ENCOUNTER — THERAPY VISIT (OUTPATIENT)
Dept: PHYSICAL THERAPY | Facility: CLINIC | Age: 62
End: 2022-05-18
Payer: COMMERCIAL

## 2022-05-18 DIAGNOSIS — M25.562 LEFT KNEE PAIN, UNSPECIFIED CHRONICITY: Primary | ICD-10-CM

## 2022-05-18 PROCEDURE — 97140 MANUAL THERAPY 1/> REGIONS: CPT | Mod: GP | Performed by: PHYSICAL THERAPIST

## 2022-05-18 PROCEDURE — 97110 THERAPEUTIC EXERCISES: CPT | Mod: GP | Performed by: PHYSICAL THERAPIST

## 2022-06-02 ENCOUNTER — THERAPY VISIT (OUTPATIENT)
Dept: PHYSICAL THERAPY | Facility: CLINIC | Age: 62
End: 2022-06-02
Payer: COMMERCIAL

## 2022-06-02 DIAGNOSIS — M25.562 LEFT KNEE PAIN, UNSPECIFIED CHRONICITY: Primary | ICD-10-CM

## 2022-06-02 PROCEDURE — 97140 MANUAL THERAPY 1/> REGIONS: CPT | Mod: GP | Performed by: PHYSICAL THERAPIST

## 2022-06-02 PROCEDURE — 97110 THERAPEUTIC EXERCISES: CPT | Mod: GP | Performed by: PHYSICAL THERAPIST

## 2022-06-18 ENCOUNTER — HEALTH MAINTENANCE LETTER (OUTPATIENT)
Age: 62
End: 2022-06-18

## 2022-07-28 PROBLEM — M25.562 LEFT KNEE PAIN: Status: RESOLVED | Noted: 2022-05-04 | Resolved: 2022-07-28

## 2022-07-28 NOTE — PROGRESS NOTES
Discharge Note    Progress reporting period is from initial evaluation date (please see noted date below) to Jun 2, 2022.  Linked Episodes   Type: Episode: Status: Noted: Resolved: Last update: Updated by:   PHYSICAL THERAPY Left knee pain 5/4/2022 Active 5/4/2022 6/2/2022  1:18 PM Lorin Richard, PT      Comments:       Santhosh failed to follow up and current status is unknown.  Please see information below for last relevant information on current status.  Patient seen for 3 visits.    SUBJECTIVE  Subjective changes noted by patient:  Patient states he has times where his knee to totally fine, and other days where his knee is painful and swollen. Exercsies are going wel. He continues to have some sciatic like pain down L leg  .  Current pain level is 2/10.     Previous pain level was  6/10.   Changes in function:  Yes (See Goal flowsheet attached for changes in current functional level)  Adverse reaction to treatment or activity: None    OBJECTIVE  Changes noted in objective findings: L knee AROM: 5-0-120     ASSESSMENT/PLAN  Diagnosis: Mechanical knee pain   Updated problem list and treatment plan:   Pain - HEP  Decreased ROM/flexibility - HEP  Decreased function - HEP  Decreased strength - HEP  STG/LTGs have been met or progress has been made towards goals:  Yes, please see goal flowsheet for most current information  Assessment of Progress: current status is unknown.    Last current status: Pt is progressing as expected   Self Management Plans:  HEP  I have re-evaluated this patient and find that the nature, scope, duration and intensity of the therapy is appropriate for the medical condition of the patient.  Santhosh continues to require the following intervention to meet STG and LTG's:  HEP.    Recommendations:  Discharge with current home program.  Patient to follow up with MD as needed.    Please refer to the daily flowsheet for treatment today, total treatment time and time spent performing 1:1 timed  codes.

## 2022-08-13 ENCOUNTER — HEALTH MAINTENANCE LETTER (OUTPATIENT)
Age: 62
End: 2022-08-13

## 2022-10-29 ENCOUNTER — HEALTH MAINTENANCE LETTER (OUTPATIENT)
Age: 62
End: 2022-10-29

## 2022-11-16 ENCOUNTER — LAB REQUISITION (OUTPATIENT)
Dept: LAB | Facility: CLINIC | Age: 62
End: 2022-11-16

## 2022-11-16 DIAGNOSIS — E11.9 TYPE 2 DIABETES MELLITUS WITHOUT COMPLICATIONS (H): ICD-10-CM

## 2022-11-16 LAB
ALBUMIN SERPL BCG-MCNC: 4.3 G/DL (ref 3.5–5.2)
ALP SERPL-CCNC: 74 U/L (ref 40–129)
ALT SERPL W P-5'-P-CCNC: 25 U/L (ref 10–50)
ANION GAP SERPL CALCULATED.3IONS-SCNC: 12 MMOL/L (ref 7–15)
AST SERPL W P-5'-P-CCNC: 19 U/L (ref 10–50)
BILIRUB SERPL-MCNC: 0.3 MG/DL
BUN SERPL-MCNC: 13.2 MG/DL (ref 8–23)
CALCIUM SERPL-MCNC: 9.2 MG/DL (ref 8.8–10.2)
CHLORIDE SERPL-SCNC: 100 MMOL/L (ref 98–107)
CHOLEST SERPL-MCNC: 132 MG/DL
CREAT SERPL-MCNC: 0.85 MG/DL (ref 0.67–1.17)
DEPRECATED HCO3 PLAS-SCNC: 25 MMOL/L (ref 22–29)
GFR SERPL CREATININE-BSD FRML MDRD: >90 ML/MIN/1.73M2
GLUCOSE SERPL-MCNC: 148 MG/DL (ref 70–99)
HDLC SERPL-MCNC: 41 MG/DL
LDLC SERPL CALC-MCNC: 66 MG/DL
NONHDLC SERPL-MCNC: 91 MG/DL
POTASSIUM SERPL-SCNC: 4.4 MMOL/L (ref 3.4–5.3)
PROT SERPL-MCNC: 6.4 G/DL (ref 6.4–8.3)
SODIUM SERPL-SCNC: 137 MMOL/L (ref 136–145)
TRIGL SERPL-MCNC: 123 MG/DL

## 2022-11-16 PROCEDURE — 80061 LIPID PANEL: CPT | Performed by: PHYSICIAN ASSISTANT

## 2022-11-16 PROCEDURE — 80053 COMPREHEN METABOLIC PANEL: CPT | Performed by: PHYSICIAN ASSISTANT

## 2022-12-11 ENCOUNTER — HEALTH MAINTENANCE LETTER (OUTPATIENT)
Age: 62
End: 2022-12-11

## 2022-12-15 ENCOUNTER — IMMUNIZATION (OUTPATIENT)
Dept: NURSING | Facility: CLINIC | Age: 62
End: 2022-12-15
Payer: COMMERCIAL

## 2022-12-15 PROCEDURE — 0124A COVID-19 VACCINE BIVALENT BOOSTER 12+ (PFIZER): CPT

## 2022-12-15 PROCEDURE — 91312 COVID-19 VACCINE BIVALENT BOOSTER 12+ (PFIZER): CPT

## 2023-04-02 ENCOUNTER — HEALTH MAINTENANCE LETTER (OUTPATIENT)
Age: 63
End: 2023-04-02

## 2023-06-24 ENCOUNTER — HEALTH MAINTENANCE LETTER (OUTPATIENT)
Age: 63
End: 2023-06-24

## 2023-09-02 ENCOUNTER — HEALTH MAINTENANCE LETTER (OUTPATIENT)
Age: 63
End: 2023-09-02

## 2023-09-18 ENCOUNTER — LAB REQUISITION (OUTPATIENT)
Dept: LAB | Facility: CLINIC | Age: 63
End: 2023-09-18

## 2023-09-18 DIAGNOSIS — E11.9 TYPE 2 DIABETES MELLITUS WITHOUT COMPLICATIONS (H): ICD-10-CM

## 2023-09-18 LAB
ALBUMIN SERPL BCG-MCNC: 4 G/DL (ref 3.5–5.2)
ALP SERPL-CCNC: 71 U/L (ref 40–129)
ALT SERPL W P-5'-P-CCNC: 17 U/L (ref 0–70)
ANION GAP SERPL CALCULATED.3IONS-SCNC: 14 MMOL/L (ref 7–15)
AST SERPL W P-5'-P-CCNC: 23 U/L (ref 0–45)
BILIRUB SERPL-MCNC: 0.2 MG/DL
BUN SERPL-MCNC: 17 MG/DL (ref 8–23)
CALCIUM SERPL-MCNC: 9 MG/DL (ref 8.8–10.2)
CHLORIDE SERPL-SCNC: 100 MMOL/L (ref 98–107)
CHOLEST SERPL-MCNC: 186 MG/DL
CREAT SERPL-MCNC: 1.1 MG/DL (ref 0.67–1.17)
DEPRECATED HCO3 PLAS-SCNC: 22 MMOL/L (ref 22–29)
EGFRCR SERPLBLD CKD-EPI 2021: 75 ML/MIN/1.73M2
GLUCOSE SERPL-MCNC: 233 MG/DL (ref 70–99)
HDLC SERPL-MCNC: 40 MG/DL
LDLC SERPL CALC-MCNC: 109 MG/DL
NONHDLC SERPL-MCNC: 146 MG/DL
POTASSIUM SERPL-SCNC: 3.8 MMOL/L (ref 3.4–5.3)
PROT SERPL-MCNC: 6.3 G/DL (ref 6.4–8.3)
SODIUM SERPL-SCNC: 136 MMOL/L (ref 136–145)
TRIGL SERPL-MCNC: 187 MG/DL

## 2023-09-18 PROCEDURE — 80053 COMPREHEN METABOLIC PANEL: CPT | Performed by: PHYSICIAN ASSISTANT

## 2023-09-18 PROCEDURE — 80061 LIPID PANEL: CPT | Performed by: PHYSICIAN ASSISTANT

## 2024-01-20 ENCOUNTER — HEALTH MAINTENANCE LETTER (OUTPATIENT)
Age: 64
End: 2024-01-20

## 2024-06-08 ENCOUNTER — HEALTH MAINTENANCE LETTER (OUTPATIENT)
Age: 64
End: 2024-06-08

## 2024-08-17 ENCOUNTER — HEALTH MAINTENANCE LETTER (OUTPATIENT)
Age: 64
End: 2024-08-17

## 2024-10-10 ENCOUNTER — LAB REQUISITION (OUTPATIENT)
Dept: LAB | Facility: CLINIC | Age: 64
End: 2024-10-10

## 2024-10-10 DIAGNOSIS — E11.65 TYPE 2 DIABETES MELLITUS WITH HYPERGLYCEMIA (H): ICD-10-CM

## 2024-10-10 DIAGNOSIS — E78.5 HYPERLIPIDEMIA, UNSPECIFIED: ICD-10-CM

## 2024-10-10 PROCEDURE — 80048 BASIC METABOLIC PNL TOTAL CA: CPT | Performed by: PHYSICIAN ASSISTANT

## 2024-10-10 PROCEDURE — 80061 LIPID PANEL: CPT | Performed by: PHYSICIAN ASSISTANT

## 2024-10-10 PROCEDURE — 82043 UR ALBUMIN QUANTITATIVE: CPT | Performed by: PHYSICIAN ASSISTANT

## 2024-10-11 LAB
ANION GAP SERPL CALCULATED.3IONS-SCNC: 12 MMOL/L (ref 7–15)
BUN SERPL-MCNC: 16.2 MG/DL (ref 8–23)
CALCIUM SERPL-MCNC: 9 MG/DL (ref 8.8–10.4)
CHLORIDE SERPL-SCNC: 101 MMOL/L (ref 98–107)
CHOLEST SERPL-MCNC: 132 MG/DL
CREAT SERPL-MCNC: 0.82 MG/DL (ref 0.67–1.17)
CREAT UR-MCNC: 34 MG/DL
EGFRCR SERPLBLD CKD-EPI 2021: >90 ML/MIN/1.73M2
FASTING STATUS PATIENT QL REPORTED: ABNORMAL
FASTING STATUS PATIENT QL REPORTED: NORMAL
GLUCOSE SERPL-MCNC: 252 MG/DL (ref 70–99)
HCO3 SERPL-SCNC: 22 MMOL/L (ref 22–29)
HDLC SERPL-MCNC: 40 MG/DL
LDLC SERPL CALC-MCNC: 71 MG/DL
MICROALBUMIN UR-MCNC: <12 MG/L
MICROALBUMIN/CREAT UR: NORMAL MG/G{CREAT}
NONHDLC SERPL-MCNC: 92 MG/DL
POTASSIUM SERPL-SCNC: 3.9 MMOL/L (ref 3.4–5.3)
SODIUM SERPL-SCNC: 135 MMOL/L (ref 135–145)
TRIGL SERPL-MCNC: 107 MG/DL

## 2024-10-26 ENCOUNTER — HEALTH MAINTENANCE LETTER (OUTPATIENT)
Age: 64
End: 2024-10-26

## 2025-02-23 ENCOUNTER — HEALTH MAINTENANCE LETTER (OUTPATIENT)
Age: 65
End: 2025-02-23

## 2025-05-25 ENCOUNTER — HEALTH MAINTENANCE LETTER (OUTPATIENT)
Age: 65
End: 2025-05-25

## 2025-07-09 ENCOUNTER — LAB REQUISITION (OUTPATIENT)
Dept: LAB | Facility: CLINIC | Age: 65
End: 2025-07-09

## 2025-07-09 DIAGNOSIS — E78.5 HYPERLIPIDEMIA, UNSPECIFIED: ICD-10-CM

## 2025-07-09 DIAGNOSIS — Z12.5 ENCOUNTER FOR SCREENING FOR MALIGNANT NEOPLASM OF PROSTATE: ICD-10-CM

## 2025-07-09 LAB
APO A-I SERPL-MCNC: <6 MG/DL
CHOLEST SERPL-MCNC: 84 MG/DL
FASTING STATUS PATIENT QL REPORTED: NO
HDLC SERPL-MCNC: 41 MG/DL
LDLC SERPL CALC-MCNC: 26 MG/DL
NONHDLC SERPL-MCNC: 43 MG/DL
PSA SERPL DL<=0.01 NG/ML-MCNC: 1.28 NG/ML (ref 0–4.5)
TRIGL SERPL-MCNC: 86 MG/DL

## 2025-07-09 PROCEDURE — 82172 ASSAY OF APOLIPOPROTEIN: CPT | Performed by: PHYSICIAN ASSISTANT

## 2025-07-09 PROCEDURE — 80061 LIPID PANEL: CPT | Performed by: PHYSICIAN ASSISTANT

## 2025-07-09 PROCEDURE — 83695 ASSAY OF LIPOPROTEIN(A): CPT | Performed by: PHYSICIAN ASSISTANT

## 2025-07-09 PROCEDURE — G0103 PSA SCREENING: HCPCS | Performed by: PHYSICIAN ASSISTANT
